# Patient Record
Sex: FEMALE | Race: BLACK OR AFRICAN AMERICAN | NOT HISPANIC OR LATINO | Employment: FULL TIME | URBAN - METROPOLITAN AREA
[De-identification: names, ages, dates, MRNs, and addresses within clinical notes are randomized per-mention and may not be internally consistent; named-entity substitution may affect disease eponyms.]

---

## 2022-02-07 ENCOUNTER — TRANSCRIBE ORDERS (OUTPATIENT)
Dept: ADMINISTRATIVE | Facility: HOSPITAL | Age: 37
End: 2022-02-07

## 2022-02-07 DIAGNOSIS — N64.4 MASTODYNIA: Primary | ICD-10-CM

## 2022-02-07 DIAGNOSIS — N64.4 PAIN OF BOTH BREASTS: Primary | ICD-10-CM

## 2022-02-17 ENCOUNTER — HOSPITAL ENCOUNTER (OUTPATIENT)
Dept: MAMMOGRAPHY | Facility: HOSPITAL | Age: 37
Discharge: HOME | End: 2022-02-17

## 2022-02-17 ENCOUNTER — HOSPITAL ENCOUNTER (OUTPATIENT)
Dept: ULTRASOUND IMAGING | Facility: HOSPITAL | Age: 37
Discharge: HOME | End: 2022-02-17

## 2022-02-17 DIAGNOSIS — N64.4 PAIN OF BOTH BREASTS: ICD-10-CM

## 2022-02-17 DIAGNOSIS — N64.4 MASTODYNIA: ICD-10-CM

## 2022-02-17 PROCEDURE — G0279 TOMOSYNTHESIS, MAMMO: HCPCS

## 2022-02-17 PROCEDURE — 77066 DX MAMMO INCL CAD BI: CPT

## 2022-03-28 ENCOUNTER — HOSPITAL ENCOUNTER (EMERGENCY)
Facility: HOSPITAL | Age: 37
Discharge: HOME | End: 2022-03-28
Attending: EMERGENCY MEDICINE | Admitting: EMERGENCY MEDICINE

## 2022-03-28 VITALS
HEART RATE: 81 BPM | DIASTOLIC BLOOD PRESSURE: 94 MMHG | HEIGHT: 63 IN | BODY MASS INDEX: 39.57 KG/M2 | TEMPERATURE: 98.6 F | OXYGEN SATURATION: 100 % | RESPIRATION RATE: 18 BRPM | SYSTOLIC BLOOD PRESSURE: 154 MMHG | WEIGHT: 223.33 LBS

## 2022-03-28 DIAGNOSIS — J03.90 TONSILLITIS: Primary | ICD-10-CM

## 2022-03-28 LAB
HOLD SPECIMEN: NORMAL
HOLD SPECIMEN: NORMAL
S PYO AG THROAT QL: NEGATIVE
WHOLE BLOOD HOLD SPECIMEN: NORMAL
WHOLE BLOOD HOLD SPECIMEN: NORMAL

## 2022-03-28 PROCEDURE — 87081 CULTURE SCREEN ONLY: CPT | Performed by: EMERGENCY MEDICINE

## 2022-03-28 PROCEDURE — 25010000002 CEFTRIAXONE PER 250 MG: Performed by: NURSE PRACTITIONER

## 2022-03-28 PROCEDURE — 87880 STREP A ASSAY W/OPTIC: CPT

## 2022-03-28 PROCEDURE — 25010000002 DEXAMETHASONE PER 1 MG: Performed by: NURSE PRACTITIONER

## 2022-03-28 PROCEDURE — 99283 EMERGENCY DEPT VISIT LOW MDM: CPT

## 2022-03-28 PROCEDURE — 87147 CULTURE TYPE IMMUNOLOGIC: CPT | Performed by: EMERGENCY MEDICINE

## 2022-03-28 PROCEDURE — 36415 COLL VENOUS BLD VENIPUNCTURE: CPT | Performed by: EMERGENCY MEDICINE

## 2022-03-28 PROCEDURE — 96372 THER/PROPH/DIAG INJ SC/IM: CPT

## 2022-03-28 RX ORDER — HYDROXYZINE HYDROCHLORIDE 10 MG/1
10 TABLET, FILM COATED ORAL 3 TIMES DAILY PRN
COMMUNITY

## 2022-03-28 RX ORDER — DEXAMETHASONE SODIUM PHOSPHATE 10 MG/ML
10 INJECTION INTRAMUSCULAR; INTRAVENOUS ONCE
Status: COMPLETED | OUTPATIENT
Start: 2022-03-28 | End: 2022-03-28

## 2022-03-28 RX ORDER — VENLAFAXINE 37.5 MG/1
37.5 TABLET ORAL 2 TIMES DAILY
COMMUNITY
End: 2022-04-13 | Stop reason: SDUPTHER

## 2022-03-28 RX ORDER — SODIUM CHLORIDE 0.9 % (FLUSH) 0.9 %
10 SYRINGE (ML) INJECTION AS NEEDED
Status: DISCONTINUED | OUTPATIENT
Start: 2022-03-28 | End: 2022-03-28 | Stop reason: HOSPADM

## 2022-03-28 RX ORDER — CEPHALEXIN 500 MG/1
500 CAPSULE ORAL 2 TIMES DAILY
Qty: 20 CAPSULE | Refills: 0 | Status: SHIPPED | OUTPATIENT
Start: 2022-03-28 | End: 2022-05-12

## 2022-03-28 RX ADMIN — DEXAMETHASONE SODIUM PHOSPHATE 10 MG: 10 INJECTION INTRAMUSCULAR; INTRAVENOUS at 18:45

## 2022-03-28 RX ADMIN — LIDOCAINE HYDROCHLORIDE 1 G: 10 INJECTION, SOLUTION EPIDURAL; INFILTRATION; INTRACAUDAL; PERINEURAL at 18:47

## 2022-03-30 LAB — BACTERIA SPEC AEROBE CULT: ABNORMAL

## 2022-04-13 RX ORDER — SERTRALINE HYDROCHLORIDE 25 MG/1
25 TABLET, FILM COATED ORAL DAILY
COMMUNITY
End: 2022-05-12

## 2022-04-13 RX ORDER — IPRATROPIUM BROMIDE AND ALBUTEROL SULFATE 2.5; .5 MG/3ML; MG/3ML
SOLUTION RESPIRATORY (INHALATION)
COMMUNITY
Start: 2022-02-07

## 2022-04-13 RX ORDER — ONDANSETRON 4 MG/1
TABLET, FILM COATED ORAL
COMMUNITY
End: 2022-05-12

## 2022-04-13 RX ORDER — DICYCLOMINE HCL 20 MG
TABLET ORAL
COMMUNITY

## 2022-04-13 RX ORDER — ALBUTEROL SULFATE 90 UG/1
2 AEROSOL, METERED RESPIRATORY (INHALATION) EVERY 4 HOURS PRN
COMMUNITY
Start: 2022-02-07

## 2022-04-13 RX ORDER — IBUPROFEN 800 MG/1
TABLET ORAL
COMMUNITY
Start: 2022-02-07 | End: 2022-05-13 | Stop reason: HOSPADM

## 2022-04-13 RX ORDER — VENLAFAXINE HYDROCHLORIDE 37.5 MG/1
CAPSULE, EXTENDED RELEASE ORAL
COMMUNITY
Start: 2022-02-07

## 2022-04-20 ENCOUNTER — PREP FOR SURGERY (OUTPATIENT)
Dept: OTHER | Facility: HOSPITAL | Age: 37
End: 2022-04-20

## 2022-04-20 ENCOUNTER — OFFICE VISIT (OUTPATIENT)
Dept: OTOLARYNGOLOGY | Facility: CLINIC | Age: 37
End: 2022-04-20

## 2022-04-20 VITALS — BODY MASS INDEX: 40.4 KG/M2 | HEIGHT: 63 IN | WEIGHT: 228 LBS | TEMPERATURE: 97.3 F

## 2022-04-20 DIAGNOSIS — J03.91 RECURRENT TONSILLITIS: Primary | ICD-10-CM

## 2022-04-20 DIAGNOSIS — J35.1 TONSILLAR HYPERTROPHY: ICD-10-CM

## 2022-04-20 PROCEDURE — 99204 OFFICE O/P NEW MOD 45 MIN: CPT | Performed by: OTOLARYNGOLOGY

## 2022-04-20 NOTE — PROGRESS NOTES
Patient Name: Clarisse Tellez   Visit Date: 2022   Patient ID: [id number]  Provider: Laureano Barrera MD    Sex: female  [place] [facility] [place] [facility]   YOB: 1985  Location Address: [address] [place] [address], [address] [place] [address],?[place]?[id number]    Primary Care Provider Monse Sales APRN  Location Phone: [phone]    Referring Provider: KARLA Askew        Chief Complaint  Sore Throat    History of Present Illness  Clarisse Tellez is a 36 y.o. female who presents to [facility] today as a consult from KARLA Askew for evaluation of recurrent tonsillitis.  She tells me she has had issues with recurrent tonsillitis for a number of years.  However, since January she has been treated with 4 rounds of antibiotics for severe sore throat.  Her typical episode involves bilateral sore throat, cervical lymphadenopathy, dysphagia, and occasional trouble breathing.  They are not associated with any rhinorrhea or heartburn.  They tend to vary in duration but often resolve with antibiotics.  She denies any issues with tonsil stones.    Past Medical History:   Diagnosis Date   • Anxiety    • Depression        Past Surgical History:   Procedure Laterality Date   •  SECTION     • CHOLECYSTECTOMY           Current Outpatient Medications:   •  venlafaxine XR (EFFEXOR-XR) 37.5 MG 24 hr capsule, TAKE 1 CAPSULE BY MOUTH EVERY DAY WITH FOOD, Disp: , Rfl:   •  albuterol sulfate  (90 Base) MCG/ACT inhaler, Inhale 2 puffs Every 4 (Four) Hours As Needed., Disp: , Rfl:   •  cephalexin (KEFLEX) 500 MG capsule, Take 1 capsule by mouth 2 (Two) Times a Day., Disp: 20 capsule, Rfl: 0  •  dicyclomine (BENTYL) 20 MG tablet, , Disp: , Rfl:   •  hydrOXYzine (ATARAX) 10 MG tablet, Take 10 mg by mouth 3 (Three) Times a Day As Needed for Itching., Disp: , Rfl:   •  ibuprofen (ADVIL,MOTRIN) 800 MG tablet, TAKE 1 TABLET BY MOUTH THREE TIMES  "DAILY WITH FOOD FOR 14 DAYS AS NEEDED, Disp: , Rfl:   •  ipratropium-albuterol (DUO-NEB) 0.5-2.5 mg/3 ml nebulizer, USE 3 ML VIA NEBULIZER EVERY 6 HOURS AS NEEDED, Disp: , Rfl:   •  ondansetron (ZOFRAN) 4 MG tablet, , Disp: , Rfl:   •  sertraline (ZOLOFT) 25 MG tablet, Take 25 mg by mouth Daily., Disp: , Rfl:      No Known Allergies    Social History     Tobacco Use   • Smoking status: Current Some Day Smoker     Packs/day: 0.25     Years: 1.00     Pack years: 0.25   • Smokeless tobacco: Never Used   Substance Use Topics   • Alcohol use: Not Currently   • Drug use: Never        Objective     Vital Signs:   Temp 97.3 °F (36.3 °C) (Temporal)   Ht 160 cm (63\")   Wt 103 kg (228 lb)   BMI 40.39 kg/m²       Physical Exam    General: Well developed, well nourished patient of stated age in no acute distress. Voice is strong and clear.   Head: Normocephalic and atraumatic.  Face: No lesions.  Bilateral parotid and submandibular glands are unremarkable.  Stensen's and Warthin's ducts are productive of clear saliva bilaterally.  House-Brackmann I/VI     bilaterally.   muscles and temporomandibular joint nontender to palpation.  No TMJ crepitus.  Eyes: PERRLA, sclerae anicteric, no conjunctival injection. Extraocular movements are intact and full. No nystagmus.   Ears: Auricles are normal in appearance. Bilateral external auditory canals are unremarkable. Bilateral tympanic membranes are clear and without effusion. Hearing normal to conversational voice.   Nose: External nose is normal in appearance. Bilateral nares are patent with normal appearing mucosa. Septum midline. Turbinates are unremarkable. No lesions.   Oral Cavity: Lips are normal in appearance. Oral mucosa is unremarkable. Gingiva is unremarkable. Normal dentition for age. Tongue is unremarkable with good movement. Hard palate is unremarkable.   Oropharynx: Soft palate is unremarkable with full movement. Uvula is unremarkable. Bilateral tonsils are 3+ " and cryptic. Posterior oropharynx is unremarkable.    Larynx and hypopharynx: Deferred secondary to gag reflex.  Neck: Supple.  No mass.  Nontender to palpation.  Trachea midline. Thyroid normal size and without nodules to palpation.   Lymphatic: No lymphadenopathy upon palpation.  Respiratory: Clear to auscultation bilaterally, nonlabored respirations    Cardiovascular: RRR, no murmurs, rubs, or gallops,   Psychiatric: Appropriate affect, cooperative   Neurologic: Oriented x 3, strength symmetric in all extremities, Cranial Nerves II-XII are grossly intact to confrontation   Skin: Warm and dry. No rashes.    Procedures           Result Review :               Assessment and Plan    Diagnoses and all orders for this visit:    1. Recurrent tonsillitis (Primary)    2. Tonsillar hypertrophy    Impressions and findings were discussed at great length.  Currently, she is seen for evaluation of recurrent tonsillitis.  Examination today reveals 3+ cryptic tonsils bilaterally.  We discussed the pathophysiology and natural history of both streptococcal and viral tonsillitis.  Options for management including continued observation and medical management versus bilateral tonsillectomy were discussed. The risks, benefits, and alternatives to tonsillectomy were discussed. The risks include dehydration, bleeding, pain, change in voice, continued strep throat, and nasal regurgitation.  After thorough discussion she elected to pursue tonsillectomy.  This will be scheduled at her earliest convenience.        Follow Up   Return if symptoms worsen or fail to improve.  Patient was given instructions and counseling regarding her condition or for health maintenance advice. Please see specific information pulled into the AVS if appropriate.

## 2022-05-12 NOTE — PRE-PROCEDURE INSTRUCTIONS
PATIENT INSTRUCTED TO BE:    - NPO AFTER MIDNIGHT EXCEPT CAN HAVE CLEAR LIQUIDS 2 HOURS PRIOR TO SURGERY ARRIVAL TIME     - TO HOLD ALL VITAMINS, SUPPLEMENTS, NSAIDS FOR ONE WEEK PRIOR TO THEIR SURGICAL PROCEDURE    - INSTRUCTED NO LOTIONS, JEWELRY, PIERCINGS, OR DEODORANT DAY OF SURGERY    - IF DIABETIC, CHECK BLOOD GLUCOSE IF LESS THAN 70 CALL PREOP AREA -AM OF SURGERY     - INSTRUCTED TO TAKE THE FOLLOWING MEDICATIONS THE DAY OF SURGERY:         EFFEXOR, NEBS/INHALERS, BENTYL, HYDROXYZINE PRN    PATIENT VERBALIZED UNDERSTANDING

## 2022-05-13 ENCOUNTER — DOCUMENTATION (OUTPATIENT)
Dept: OTOLARYNGOLOGY | Facility: CLINIC | Age: 37
End: 2022-05-13

## 2022-05-13 ENCOUNTER — ANESTHESIA EVENT (OUTPATIENT)
Dept: PERIOP | Facility: HOSPITAL | Age: 37
End: 2022-05-13

## 2022-05-13 ENCOUNTER — HOSPITAL ENCOUNTER (OUTPATIENT)
Facility: HOSPITAL | Age: 37
Setting detail: HOSPITAL OUTPATIENT SURGERY
Discharge: HOME | End: 2022-05-13
Attending: OTOLARYNGOLOGY | Admitting: OTOLARYNGOLOGY

## 2022-05-13 ENCOUNTER — ANESTHESIA (OUTPATIENT)
Dept: PERIOP | Facility: HOSPITAL | Age: 37
End: 2022-05-13

## 2022-05-13 VITALS
HEIGHT: 63 IN | RESPIRATION RATE: 12 BRPM | HEART RATE: 72 BPM | SYSTOLIC BLOOD PRESSURE: 121 MMHG | WEIGHT: 227.29 LBS | DIASTOLIC BLOOD PRESSURE: 78 MMHG | BODY MASS INDEX: 40.27 KG/M2 | TEMPERATURE: 97.5 F | OXYGEN SATURATION: 91 %

## 2022-05-13 DIAGNOSIS — J03.91 RECURRENT TONSILLITIS: ICD-10-CM

## 2022-05-13 PROCEDURE — 25010000002 PROPOFOL 10 MG/ML EMULSION: Performed by: NURSE ANESTHETIST, CERTIFIED REGISTERED

## 2022-05-13 PROCEDURE — 25010000002 MIDAZOLAM PER 1 MG: Performed by: ANESTHESIOLOGY

## 2022-05-13 PROCEDURE — 0 HYDROMORPHONE 1 MG/ML SOLUTION: Performed by: NURSE ANESTHETIST, CERTIFIED REGISTERED

## 2022-05-13 PROCEDURE — 88304 TISSUE EXAM BY PATHOLOGIST: CPT | Performed by: OTOLARYNGOLOGY

## 2022-05-13 PROCEDURE — 25010000002 FENTANYL CITRATE (PF) 50 MCG/ML SOLUTION: Performed by: NURSE ANESTHETIST, CERTIFIED REGISTERED

## 2022-05-13 PROCEDURE — 25010000002 NEOSTIGMINE 10 MG/10ML SOLUTION: Performed by: NURSE ANESTHETIST, CERTIFIED REGISTERED

## 2022-05-13 PROCEDURE — 25010000002 ONDANSETRON PER 1 MG: Performed by: NURSE ANESTHETIST, CERTIFIED REGISTERED

## 2022-05-13 PROCEDURE — 42826 REMOVAL OF TONSILS: CPT | Performed by: OTOLARYNGOLOGY

## 2022-05-13 PROCEDURE — 25010000002 HYDROMORPHONE 1 MG/ML SOLUTION: Performed by: NURSE ANESTHETIST, CERTIFIED REGISTERED

## 2022-05-13 PROCEDURE — 25010000002 DEXAMETHASONE PER 1 MG: Performed by: NURSE ANESTHETIST, CERTIFIED REGISTERED

## 2022-05-13 RX ORDER — ONDANSETRON 2 MG/ML
4 INJECTION INTRAMUSCULAR; INTRAVENOUS ONCE AS NEEDED
Status: DISCONTINUED | OUTPATIENT
Start: 2022-05-13 | End: 2022-05-13 | Stop reason: HOSPADM

## 2022-05-13 RX ORDER — FENTANYL CITRATE 50 UG/ML
INJECTION, SOLUTION INTRAMUSCULAR; INTRAVENOUS AS NEEDED
Status: DISCONTINUED | OUTPATIENT
Start: 2022-05-13 | End: 2022-05-13 | Stop reason: SURG

## 2022-05-13 RX ORDER — OXYCODONE HYDROCHLORIDE 5 MG/1
5 TABLET ORAL
Status: DISCONTINUED | OUTPATIENT
Start: 2022-05-13 | End: 2022-05-13 | Stop reason: HOSPADM

## 2022-05-13 RX ORDER — LIDOCAINE HYDROCHLORIDE 20 MG/ML
INJECTION, SOLUTION EPIDURAL; INFILTRATION; INTRACAUDAL; PERINEURAL AS NEEDED
Status: DISCONTINUED | OUTPATIENT
Start: 2022-05-13 | End: 2022-05-13 | Stop reason: SURG

## 2022-05-13 RX ORDER — SODIUM CHLORIDE, SODIUM LACTATE, POTASSIUM CHLORIDE, CALCIUM CHLORIDE 600; 310; 30; 20 MG/100ML; MG/100ML; MG/100ML; MG/100ML
9 INJECTION, SOLUTION INTRAVENOUS CONTINUOUS PRN
Status: DISCONTINUED | OUTPATIENT
Start: 2022-05-13 | End: 2022-05-13 | Stop reason: HOSPADM

## 2022-05-13 RX ORDER — PROMETHAZINE HYDROCHLORIDE 12.5 MG/1
25 TABLET ORAL ONCE AS NEEDED
Status: DISCONTINUED | OUTPATIENT
Start: 2022-05-13 | End: 2022-05-13 | Stop reason: HOSPADM

## 2022-05-13 RX ORDER — MAGNESIUM HYDROXIDE 1200 MG/15ML
LIQUID ORAL AS NEEDED
Status: DISCONTINUED | OUTPATIENT
Start: 2022-05-13 | End: 2022-05-13 | Stop reason: HOSPADM

## 2022-05-13 RX ORDER — PREDNISONE 20 MG/1
40 TABLET ORAL DAILY
Qty: 10 TABLET | Refills: 0 | Status: SHIPPED | OUTPATIENT
Start: 2022-05-13 | End: 2022-05-18

## 2022-05-13 RX ORDER — DEXAMETHASONE SODIUM PHOSPHATE 4 MG/ML
INJECTION, SOLUTION INTRA-ARTICULAR; INTRALESIONAL; INTRAMUSCULAR; INTRAVENOUS; SOFT TISSUE AS NEEDED
Status: DISCONTINUED | OUTPATIENT
Start: 2022-05-13 | End: 2022-05-13 | Stop reason: SURG

## 2022-05-13 RX ORDER — GLYCOPYRROLATE 0.2 MG/ML
0.2 INJECTION INTRAMUSCULAR; INTRAVENOUS
Status: COMPLETED | OUTPATIENT
Start: 2022-05-13 | End: 2022-05-13

## 2022-05-13 RX ORDER — MEPERIDINE HYDROCHLORIDE 25 MG/ML
12.5 INJECTION INTRAMUSCULAR; INTRAVENOUS; SUBCUTANEOUS
Status: DISCONTINUED | OUTPATIENT
Start: 2022-05-13 | End: 2022-05-13 | Stop reason: HOSPADM

## 2022-05-13 RX ORDER — NEOSTIGMINE METHYLSULFATE 1 MG/ML
INJECTION, SOLUTION INTRAVENOUS AS NEEDED
Status: DISCONTINUED | OUTPATIENT
Start: 2022-05-13 | End: 2022-05-13 | Stop reason: SURG

## 2022-05-13 RX ORDER — MIDAZOLAM HYDROCHLORIDE 1 MG/ML
2 INJECTION INTRAMUSCULAR; INTRAVENOUS ONCE
Status: COMPLETED | OUTPATIENT
Start: 2022-05-13 | End: 2022-05-13

## 2022-05-13 RX ORDER — PROPOFOL 10 MG/ML
VIAL (ML) INTRAVENOUS AS NEEDED
Status: DISCONTINUED | OUTPATIENT
Start: 2022-05-13 | End: 2022-05-13 | Stop reason: SURG

## 2022-05-13 RX ORDER — ONDANSETRON 2 MG/ML
INJECTION INTRAMUSCULAR; INTRAVENOUS AS NEEDED
Status: DISCONTINUED | OUTPATIENT
Start: 2022-05-13 | End: 2022-05-13 | Stop reason: SURG

## 2022-05-13 RX ORDER — ROCURONIUM BROMIDE 10 MG/ML
INJECTION, SOLUTION INTRAVENOUS AS NEEDED
Status: DISCONTINUED | OUTPATIENT
Start: 2022-05-13 | End: 2022-05-13 | Stop reason: SURG

## 2022-05-13 RX ORDER — GLYCOPYRROLATE 0.2 MG/ML
INJECTION INTRAMUSCULAR; INTRAVENOUS AS NEEDED
Status: DISCONTINUED | OUTPATIENT
Start: 2022-05-13 | End: 2022-05-13 | Stop reason: SURG

## 2022-05-13 RX ORDER — PROMETHAZINE HYDROCHLORIDE 25 MG/1
25 SUPPOSITORY RECTAL ONCE AS NEEDED
Status: DISCONTINUED | OUTPATIENT
Start: 2022-05-13 | End: 2022-05-13 | Stop reason: HOSPADM

## 2022-05-13 RX ORDER — ONDANSETRON HYDROCHLORIDE 8 MG/1
8 TABLET, FILM COATED ORAL EVERY 8 HOURS PRN
Qty: 9 TABLET | Refills: 1 | Status: SHIPPED | OUTPATIENT
Start: 2022-05-13

## 2022-05-13 RX ORDER — ACETAMINOPHEN 500 MG
1000 TABLET ORAL ONCE
Status: COMPLETED | OUTPATIENT
Start: 2022-05-13 | End: 2022-05-13

## 2022-05-13 RX ADMIN — ACETAMINOPHEN 1000 MG: 500 TABLET ORAL at 08:09

## 2022-05-13 RX ADMIN — DEXAMETHASONE SODIUM PHOSPHATE 12 MG: 4 INJECTION INTRA-ARTICULAR; INTRALESIONAL; INTRAMUSCULAR; INTRAVENOUS; SOFT TISSUE at 09:12

## 2022-05-13 RX ADMIN — ONDANSETRON 4 MG: 2 INJECTION INTRAMUSCULAR; INTRAVENOUS at 09:26

## 2022-05-13 RX ADMIN — SODIUM CHLORIDE, POTASSIUM CHLORIDE, SODIUM LACTATE AND CALCIUM CHLORIDE 9 ML/HR: 600; 310; 30; 20 INJECTION, SOLUTION INTRAVENOUS at 08:09

## 2022-05-13 RX ADMIN — GLYCOPYRROLATE 0.2 MG: 0.2 INJECTION INTRAMUSCULAR; INTRAVENOUS at 08:57

## 2022-05-13 RX ADMIN — HYDROMORPHONE HYDROCHLORIDE 1 MG: 1 INJECTION, SOLUTION INTRAMUSCULAR; INTRAVENOUS; SUBCUTANEOUS at 09:19

## 2022-05-13 RX ADMIN — MIDAZOLAM HYDROCHLORIDE 2 MG: 1 INJECTION, SOLUTION INTRAMUSCULAR; INTRAVENOUS at 08:57

## 2022-05-13 RX ADMIN — GLYCOPYRROLATE 0.4 MG: 0.2 INJECTION INTRAMUSCULAR; INTRAVENOUS at 09:34

## 2022-05-13 RX ADMIN — LIDOCAINE HYDROCHLORIDE 100 MG: 20 INJECTION, SOLUTION EPIDURAL; INFILTRATION; INTRACAUDAL; PERINEURAL at 09:02

## 2022-05-13 RX ADMIN — ROCURONIUM BROMIDE 40 MG: 10 INJECTION INTRAVENOUS at 09:02

## 2022-05-13 RX ADMIN — PROPOFOL 200 MG: 10 INJECTION, EMULSION INTRAVENOUS at 09:02

## 2022-05-13 RX ADMIN — HYDROMORPHONE HYDROCHLORIDE 0.5 MG: 1 INJECTION, SOLUTION INTRAMUSCULAR; INTRAVENOUS; SUBCUTANEOUS at 10:38

## 2022-05-13 RX ADMIN — FENTANYL CITRATE 100 MCG: 50 INJECTION, SOLUTION INTRAMUSCULAR; INTRAVENOUS at 09:02

## 2022-05-13 RX ADMIN — NEOSTIGMINE METHYLSULFATE 3 MG: 0.5 INJECTION INTRAVENOUS at 09:34

## 2022-05-13 NOTE — ANESTHESIA POSTPROCEDURE EVALUATION
Patient: Clarisse Tellez    Procedure Summary     Date: 05/13/22 Room / Location: [facility] OR  / [facility]    Anesthesia Start: 0859 Anesthesia Stop: 0948    Procedure: TONSILLECTOMY (N/A Throat) Diagnosis:       Recurrent tonsillitis      (Recurrent tonsillitis [J03.91])    Surgeons: Laureano Barrera MD Provider: Eduardo Paulino MD    Anesthesia Type: general ASA Status: 2          Anesthesia Type: general    Vitals  Vitals Value Taken Time   /74 05/13/22 0947   Temp     Pulse 79 05/13/22 0950   Resp     SpO2 93 % 05/13/22 0950   Vitals shown include unvalidated device data.        Post Anesthesia Care and Evaluation    Patient location during evaluation: bedside  Patient participation: complete - patient participated  Level of consciousness: responsive to verbal stimuli, responsive to light touch, responsive to noxious stimuli, responsive to painful stimuli, responsive to physical stimuli and sleepy but conscious  Pain score: 3  Pain management: adequate  Airway patency: patent  Anesthetic complications: No anesthetic complications  PONV Status: none  Cardiovascular status: acceptable and stable  Respiratory status: acceptable and nasal cannula  Hydration status: acceptable    Comments: An Anesthesiologist personally participated in the most demanding procedures (including induction and emergence if applicable) in the anesthesia plan, monitored the course of anesthesia administration at frequent intervals and remained physically present and available for immediate diagnosis and treatment of emergencies.

## 2022-05-13 NOTE — DISCHARGE INSTRUCTIONS
DISCHARGE INSTRUCTIONS  TONSILLECTOMY/ADENOIDECTOMY  For your surgery you had:  General anesthesia (you may have a sore throat for the first 24 hours)  You received an anesthesia medication today that can cause hormonal forms of birth control to be ineffective. You should use a different form of birth control (to prevent pregnancy) for 7 days.  IV sedation  Local anesthesia  Monitored anesthesia care  You received a medicated patch for nausea prevention today (behind your ear). It is recommended that you remove it 24-48 hours post-operatively. It must be removed within 72 hours.   You may experience dizziness, drowsiness, or lightheadedness for several hours following surgery.  Do not stay alone today or tonight.  Limit your activity for 24 hours.  You should not drive or operate machinery, drink alcohol, or sign legally binding documents for 24 hours or while you are taking pain medication.  Resume your diet slowly.  Follow any special dietary instructions you may have been given by your doctor.  Last dose of pain medication was given at:    NOTIFY YOUR DOCTOR IF YOU EXPERIENCE ANY OF THE FOLLOWING:  Temperature greater than 102° Fahrenheit  Shaking chills  Redness or excessive drainage from incision  Nausea, vomiting and/or pain that is not controlled by prescribed medications  Increase in bleeding or bleeding that is excessive  Unable to urinate in 6 hours after surgery  If unable to reach your doctor, please go to the closest Emergency room Encourage the patient to drink liquids every hour the day of surgery and every two hours during the night.  We would like for the patient to drink at least 2-3 quarts of liquid within a 24-hour period.  Avoid red liquids.  Keep cool mist humidifier in the room with the patient.  If excessive bleeding should occur, bring the patient to the Emergency Room.  The ER doctor will notify the doctor.  If low grade fever develops, encourage the patient to drink more.  If temperature  is over 102°, notify your doctor.  Rest is encouraged for several days following surgery.  Keep head elevated on at least one pillow.  Medications per physician instructions as indicated on Discharge Medication Information Sheet.  You should see   for follow-up care  on   .  Phone number:      SPECIAL INSTRUCTIONS:

## 2022-05-13 NOTE — ANESTHESIA PREPROCEDURE EVALUATION
Anesthesia Evaluation     Patient summary reviewed and Nursing notes reviewed   no history of anesthetic complications:  NPO Solid Status: > 8 hours  NPO Liquid Status: > 2 hours           Airway   Mallampati: II  TM distance: >3 FB  Neck ROM: full  No difficulty expected  Dental      Pulmonary - normal exam    breath sounds clear to auscultation  (+) asthma,  Cardiovascular - negative cardio ROS and normal exam  Exercise tolerance: good (4-7 METS)    Rhythm: regular  Rate: normal        Neuro/Psych- negative ROS  GI/Hepatic/Renal/Endo - negative ROS     Musculoskeletal (-) negative ROS    Abdominal    Substance History - negative use     OB/GYN negative ob/gyn ROS         Other - negative ROS                       Anesthesia Plan    ASA 2     general   (Patient understands anesthesia not responsible for dental damage.    Pt does not have inhaler with her this am, states breathing is as good as it gets)  intravenous induction     Anesthetic plan, all risks, benefits, and alternatives have been provided, discussed and informed consent has been obtained with: patient.  Use of blood products discussed with patient .   Plan discussed with CRNA.        CODE STATUS:

## 2022-05-13 NOTE — INTERVAL H&P NOTE
H&P reviewed.  The patient was examined and there are no changes to the H&P. Allergies were reviewed.

## 2022-05-13 NOTE — OP NOTE
TONSILLECTOMY AND ADENOIDECTOMY  Procedure Report    Patient Name:  Clarisse Tellez  YOB: 1985    Date of Surgery:  5/13/2022     Indications:      Pre-op Diagnosis:   Recurrent tonsillitis [J03.91]       Post-Op Diagnosis Codes:     * Recurrent tonsillitis [J03.91]    Procedure/CPT® Codes:      Procedure(s):  TONSILLECTOMY    Staff:  Surgeon(s):  Laureano Barrera MD         Anesthesia: Choice    Estimated Blood Loss: 2 mL    Implants:    Nothing was implanted during the procedure    Specimen:          Specimens     ID Source Type Tests Collected By Collected At Frozen?    A Tonsils Tissue · TISSUE PATHOLOGY EXAM   Laureano Barrera MD 5/13/22 0915 No    Description: RIGHT TONSIL    B Tonsils Tissue · TISSUE PATHOLOGY EXAM   Laureano Barrera MD 5/13/22 0919 No    Description: LEFT TONSIL              Findings: 3+ cryptic tonsils bilaterally    Complications: None    Description of Procedure:   The patient was brought back to the operating room and placed supine upon the table. General endotracheal anesthesia was successfully established and the patient was prepped and draped in the usual manner for adenotonsillectomy. The McIvor mouth gag was inserted and used to expose the bilateral tonsils which were noted to be 3+. The right tonsil was grasped with the Allis forceps and retracted medially to aid in exposure. It was dissected from its fossa in the usual manner using Bovie cautery. Hemostasis was obtained using suction cautery. A similar procedure was then repeated on the patient's left palatine tonsil. The oropharynx was then irrigated profusely with sterile saline and again hemostasis was checked and confirmed. An orogastric tube placed and used to evacuate the patient's stomach contents. The McIvor mouth gag was then removed and the patient was returned to the care of anesthesia. The patient tolerated the procedure well and was transferred to the recovery room  in satisfactory condition without apparent complication.          Laureano Barrera MD     Date: 5/13/2022  Time: 09:50 EDT

## 2022-05-16 ENCOUNTER — OFFICE VISIT (OUTPATIENT)
Dept: OTOLARYNGOLOGY | Facility: CLINIC | Age: 37
End: 2022-05-16

## 2022-05-16 VITALS — HEIGHT: 63 IN | WEIGHT: 226.2 LBS | BODY MASS INDEX: 40.08 KG/M2 | TEMPERATURE: 97.7 F

## 2022-05-16 DIAGNOSIS — J35.1 TONSILLAR HYPERTROPHY: ICD-10-CM

## 2022-05-16 DIAGNOSIS — J03.91 RECURRENT TONSILLITIS: Primary | ICD-10-CM

## 2022-05-16 DIAGNOSIS — R05.9 COUGH: ICD-10-CM

## 2022-05-16 LAB
CYTO UR: NORMAL
LAB AP CASE REPORT: NORMAL
LAB AP CLINICAL INFORMATION: NORMAL
PATH REPORT.FINAL DX SPEC: NORMAL
PATH REPORT.GROSS SPEC: NORMAL

## 2022-05-16 PROCEDURE — 99024 POSTOP FOLLOW-UP VISIT: CPT | Performed by: OTOLARYNGOLOGY

## 2022-05-16 RX ORDER — GUAIFENESIN/DEXTROMETHORPHAN 100-10MG/5
5 SYRUP ORAL 3 TIMES DAILY PRN
Qty: 150 ML | Refills: 1 | Status: SHIPPED | OUTPATIENT
Start: 2022-05-16

## 2022-05-16 NOTE — PROGRESS NOTES
"Patient Name: Clarisse Tellez   Visit Date: 2022   Patient ID: [id number]  Provider: Laureano Barrera MD    Sex: female  [place] [facility] [place] [facility]   YOB: 1985  Location Address: [address] [place] [address], [address],?[place]?[address]    Primary Care Provider Monse Sales APRN  Location Phone: [phone]    Referring Provider: No ref. provider found        Chief Complaint  Post op bleeding    History of Present Illness  Clarisse Tellez is a 36 y.o. female who returns today for follow-up status post tonsillectomy on 2022 secondary to recurrent tonsillitis.  Final pathology was found to be consistent with reactive lymphoid hyperplasia and acute inflammation.  She tells me that she has had issues with cough since last night and has been coughing up some bright red blood intermixed with mucus and pieces of scab.  She has not had any issues with vomiting and the coughing is very intermittent.  She is drinking well.  Her pain is well controlledwith her Hycet and ibuprofen.  She was prescribed a course of prednisone and Zofran on Friday secondary to uvular edema and nausea.  She has not picked up these medications and is doing better from that standpoint.    Past Medical History:   Diagnosis Date   • Anxiety    • Asthma     USES INHALERS \"SEASONAL\"   • Depression    • Recurrent tonsillitis     CURRENTLY       Past Surgical History:   Procedure Laterality Date   •  SECTION     • CHOLECYSTECTOMY     • TONSILLECTOMY AND ADENOIDECTOMY N/A 2022    Procedure: TONSILLECTOMY;  Surgeon: Laureano Barrera MD;  [place] [facility] [place]  Service: ENT;  Laterality: N/A;         Current Outpatient Medications:   •  albuterol sulfate  (90 Base) MCG/ACT inhaler, Inhale 2 puffs Every 4 (Four) Hours As Needed., Disp: , Rfl:   •  dicyclomine (BENTYL) 20 MG tablet, , Disp: , Rfl:   •  HYDROcodone-acetaminophen (HYCET) 7.5-325 MG/15ML solution, Take 15 mL by " "mouth Every 6 (Six) Hours As Needed for Moderate Pain ., Disp: 420 mL, Rfl: 0  •  hydrOXYzine (ATARAX) 10 MG tablet, Take 10 mg by mouth 3 (Three) Times a Day As Needed for Itching., Disp: , Rfl:   •  ibuprofen (ADVIL,MOTRIN) 100 MG/5ML suspension, Take 40 mL by mouth Every 6 (Six) Hours As Needed (pain)., Disp: 473 mL, Rfl: 1  •  ipratropium-albuterol (DUO-NEB) 0.5-2.5 mg/3 ml nebulizer, USE 3 ML VIA NEBULIZER EVERY 6 HOURS AS NEEDED, Disp: , Rfl:   •  ondansetron (Zofran) 8 MG tablet, Take 1 tablet by mouth Every 8 (Eight) Hours As Needed for Nausea or Vomiting., Disp: 9 tablet, Rfl: 1  •  predniSONE (DELTASONE) 20 MG tablet, Take 2 tablets by mouth Daily for 5 days., Disp: 10 tablet, Rfl: 0  •  venlafaxine XR (EFFEXOR-XR) 37.5 MG 24 hr capsule, TAKE 1 CAPSULE BY MOUTH EVERY DAY WITH FOOD, Disp: , Rfl:   •  guaiFENesin-dextromethorphan (ROBITUSSIN DM) 100-10 MG/5ML syrup, Take 5 mL by mouth 3 (Three) Times a Day As Needed for Cough., Disp: 150 mL, Rfl: 1     No Known Allergies    Social History     Tobacco Use   • Smoking status: Current Some Day Smoker     Packs/day: 0.25     Years: 1.00     Pack years: 0.25   • Smokeless tobacco: Never Used   • Tobacco comment: INSTRUCTED NO SMOKING 24 HR PRIOR TO SURGERY -- last smoke \"day ago\"   Vaping Use   • Vaping Use: Never used   Substance Use Topics   • Alcohol use: Not Currently   • Drug use: Never        Objective     Vital Signs:   Temp 97.7 °F (36.5 °C) (Temporal)   Ht 160 cm (63\")   Wt 103 kg (226 lb 3.2 oz)   BMI 40.07 kg/m²       Physical Exam    General: Well developed, well nourished patient of stated age in no acute distress. Voice is strong and clear.   Head: Normocephalic and atraumatic.  Face: No lesions.  Bilateral parotid and submandibular glands are unremarkable.  Stensen's and Warthin's ducts are productive of clear saliva bilaterally.  House-Brackmann I/VI     bilaterally.   muscles and temporomandibular joint nontender to palpation.  No " TMJ crepitus.  Eyes: PERRLA, sclerae anicteric, no conjunctival injection. Extraocular movements are intact and full. No nystagmus.   Ears: Auricles are normal in appearance. Bilateral external auditory canals are unremarkable. Bilateral tympanic membranes are clear and without effusion. Hearing normal to conversational voice.   Nose: External nose is normal in appearance. Bilateral nares are patent with normal appearing mucosa. Septum midline. Turbinates are unremarkable. No lesions.   Oral Cavity: Lips are normal in appearance. Oral mucosa is unremarkable. Gingiva is unremarkable. Normal dentition for age. Tongue is unremarkable with good movement. Hard palate is unremarkable.   Oropharynx: Soft palate is unremarkable with full movement. Uvula is unremarkable. Bilateral tonsillar fossa without clot but obviously is sloughing crust/scab.  She was able to gargle with cold water repeatedly bringing up pieces of the crusting but no active bleeding. Posterior oropharynx is unremarkable.    Larynx and hypopharynx: Deferred secondary to gag reflex.  Neck: Supple.  No mass.  Nontender to palpation.  Trachea midline. Thyroid normal size and without nodules to palpation.   Lymphatic: No lymphadenopathy upon palpation.   Psychiatric: Appropriate affect, cooperative   Neurologic: Oriented x 3, strength symmetric in all extremities, Cranial Nerves II-XII are grossly intact to confrontation   Skin: Warm and dry. No rashes.    Procedures           Result Review :               Assessment and Plan    Diagnoses and all orders for this visit:    1. Recurrent tonsillitis (Primary)    2. Tonsillar hypertrophy    3. Cough  -     guaiFENesin-dextromethorphan (ROBITUSSIN DM) 100-10 MG/5ML syrup; Take 5 mL by mouth 3 (Three) Times a Day As Needed for Cough.  Dispense: 150 mL; Refill: 1    Impressions and findings were discussed at great length.  Currently, she appears to be experiencing intermittent oozing as part of her scabs separate  bilaterally secondary to coughing.  There is no evidence of active bleeding at this time.  We discussed worrisome signs or symptoms for which she will call me immediately.  She will also be tried on Robitussin-DM for the cough but she understands that oftentimes this is not very helpful as cough is often secondary to increased mucus production as part of the typical recovery process.    Follow Up   No follow-ups on file.  Patient was given instructions and counseling regarding her condition or for health maintenance advice. Please see specific information pulled into the AVS if appropriate.

## 2022-05-19 DIAGNOSIS — J03.91 RECURRENT TONSILLITIS: Primary | ICD-10-CM

## 2022-06-08 ENCOUNTER — OFFICE VISIT (OUTPATIENT)
Dept: OTOLARYNGOLOGY | Facility: CLINIC | Age: 37
End: 2022-06-08

## 2022-06-08 VITALS — TEMPERATURE: 97.7 F | HEIGHT: 63 IN | BODY MASS INDEX: 40.89 KG/M2 | WEIGHT: 230.8 LBS

## 2022-06-08 DIAGNOSIS — J35.1 TONSILLAR HYPERTROPHY: ICD-10-CM

## 2022-06-08 DIAGNOSIS — J03.91 RECURRENT TONSILLITIS: Primary | ICD-10-CM

## 2022-06-08 PROCEDURE — 99024 POSTOP FOLLOW-UP VISIT: CPT | Performed by: OTOLARYNGOLOGY

## 2022-06-08 RX ORDER — CYCLOBENZAPRINE HCL 10 MG
5 TABLET ORAL 2 TIMES DAILY PRN
Qty: 30 TABLET | Refills: 0 | Status: SHIPPED | OUTPATIENT
Start: 2022-06-08 | End: 2022-07-06

## 2022-06-08 NOTE — PROGRESS NOTES
"Patient Name: Clarisse Tellez   Visit Date: 2022   Patient ID: [id number]  Provider: Laureano Barrera MD    Sex: female  Location: [facility]   YOB: 1985  Location Address: [address] [place] [address], [address],?[place]?[address]    Primary Care Provider Monse Sales APRN  Location Phone: [phone]    Referring Provider: No ref. provider found        Chief Complaint  Post-op    History of Present Illness  Clarisse Tellez is a 36 y.o. female who returns today for follow-up status post tonsillectomy on 2022 secondary to recurrent tonsillitis.  Final pathology was found to be consistent with reactive lymphoid hyperplasia and acute inflammation.  She was then seen on 2022 after experiencing some bleeding with coughing.  She tells me that she is still experiencing some mild pain swallowing certain foods. She feels as though she is having difficulty talking for long periods of time.     Past Medical History:   Diagnosis Date   • Anxiety    • Asthma     USES INHALERS \"SEASONAL\"   • Depression    • Recurrent tonsillitis     CURRENTLY       Past Surgical History:   Procedure Laterality Date   •  SECTION     • CHOLECYSTECTOMY     • TONSILLECTOMY AND ADENOIDECTOMY N/A 2022    Procedure: TONSILLECTOMY;  Surgeon: Laureano Barrera MD;  Location: [facility] [place];  Service: ENT;  Laterality: N/A;         Current Outpatient Medications:   •  albuterol sulfate  (90 Base) MCG/ACT inhaler, Inhale 2 puffs Every 4 (Four) Hours As Needed., Disp: , Rfl:   •  dicyclomine (BENTYL) 20 MG tablet, , Disp: , Rfl:   •  guaiFENesin-dextromethorphan (ROBITUSSIN DM) 100-10 MG/5ML syrup, Take 5 mL by mouth 3 (Three) Times a Day As Needed for Cough., Disp: 150 mL, Rfl: 1  •  HYDROcodone-acetaminophen (HYCET) 7.5-325 MG/15ML solution, Take 15 mL by mouth Every 6 (Six) Hours As Needed for Moderate Pain ., Disp: 420 mL, Rfl: 0  •  hydrOXYzine (ATARAX) 10 MG " "tablet, Take 10 mg by mouth 3 (Three) Times a Day As Needed for Itching., Disp: , Rfl:   •  ibuprofen (ADVIL,MOTRIN) 100 MG/5ML suspension, Take 40 mL by mouth Every 6 (Six) Hours As Needed (pain)., Disp: 473 mL, Rfl: 1  •  ipratropium-albuterol (DUO-NEB) 0.5-2.5 mg/3 ml nebulizer, USE 3 ML VIA NEBULIZER EVERY 6 HOURS AS NEEDED, Disp: , Rfl:   •  ondansetron (Zofran) 8 MG tablet, Take 1 tablet by mouth Every 8 (Eight) Hours As Needed for Nausea or Vomiting., Disp: 9 tablet, Rfl: 1  •  venlafaxine XR (EFFEXOR-XR) 37.5 MG 24 hr capsule, TAKE 1 CAPSULE BY MOUTH EVERY DAY WITH FOOD, Disp: , Rfl:   •  cyclobenzaprine (FLEXERIL) 10 MG tablet, Take 0.5 tablets by mouth 2 (Two) Times a Day As Needed for Muscle Spasms for up to 28 days., Disp: 30 tablet, Rfl: 0     No Known Allergies    Social History     Tobacco Use   • Smoking status: Former Smoker     Packs/day: 0.25     Years: 1.00     Pack years: 0.25     Quit date:      Years since quittin.4   • Smokeless tobacco: Never Used   Vaping Use   • Vaping Use: Never used   Substance Use Topics   • Alcohol use: Not Currently   • Drug use: Never        Objective     Vital Signs:   Temp 97.7 °F (36.5 °C) (Temporal)   Ht 160 cm (63\")   Wt 105 kg (230 lb 12.8 oz)   BMI 40.88 kg/m²       Physical Exam    General: Well developed, well nourished patient of stated age in no acute distress. Voice is strong and clear.   Head: Normocephalic and atraumatic.  Face: No lesions.  Bilateral parotid and submandibular glands are unremarkable.  Stensen's and Warthin's ducts are productive of clear saliva bilaterally.  House-Brackmann I/VI     bilaterally.   muscles and temporomandibular joint nontender to palpation.  No TMJ crepitus.  Eyes: PERRLA, sclerae anicteric, no conjunctival injection. Extraocular movements are intact and full. No nystagmus.   Ears: Auricles are normal in appearance. Bilateral external auditory canals are unremarkable. Bilateral tympanic membranes " are clear and without effusion. Hearing normal to conversational voice.   Nose: External nose is normal in appearance. Bilateral nares are patent with normal appearing mucosa. Septum midline. Turbinates are unremarkable. No lesions.   Oral Cavity: Lips are normal in appearance. Oral mucosa is unremarkable. Gingiva is unremarkable. Normal dentition for age. Tongue is unremarkable with good movement. Hard palate is unremarkable.   Oropharynx: Soft palate is unremarkable with full movement. Uvula is unremarkable. Bilateral tonsillar fossa are healing well.  Posterior oropharynx is unremarkable.    Larynx and hypopharynx: Deferred secondary to gag reflex.  Neck: Supple.  No mass.  Nontender to palpation.  Trachea midline. Thyroid normal size and without nodules to palpation.   Lymphatic: No lymphadenopathy upon palpation.   Psychiatric: Appropriate affect, cooperative   Neurologic: Oriented x 3, strength symmetric in all extremities, Cranial Nerves II-XII are grossly intact to confrontation   Skin: Warm and dry. No rashes.    Procedures           Result Review :               Assessment and Plan    Diagnoses and all orders for this visit:    1. Recurrent tonsillitis (Primary)  -     cyclobenzaprine (FLEXERIL) 10 MG tablet; Take 0.5 tablets by mouth 2 (Two) Times a Day As Needed for Muscle Spasms for up to 28 days.  Dispense: 30 tablet; Refill: 0    2. Tonsillar hypertrophy  -     cyclobenzaprine (FLEXERIL) 10 MG tablet; Take 0.5 tablets by mouth 2 (Two) Times a Day As Needed for Muscle Spasms for up to 28 days.  Dispense: 30 tablet; Refill: 0    Impressions and findings were discussed at great length.  Currently, she is experiencing some difficulty when speaking for long periods of time and feels like certain foods still are difficult to swallow.  Examination today reveals her tonsillar fossa to be healing well.  We discussed that often times is muscles in that area 10 to tighten up after surgery and that ultimately  time will make that better but in the meantime we may try her on a course of Flexeril.  She was given ample time to ask questions, all of which were answered to her satisfaction.  She may follow-up at her previously scheduled appointment.      Follow Up   No follow-ups on file.  Patient was given instructions and counseling regarding her condition or for health maintenance advice. Please see specific information pulled into the AVS if appropriate.

## 2022-07-09 ENCOUNTER — HOSPITAL ENCOUNTER (EMERGENCY)
Facility: HOSPITAL | Age: 37
Discharge: HOME | End: 2022-07-09
Attending: EMERGENCY MEDICINE | Admitting: EMERGENCY MEDICINE

## 2022-07-09 ENCOUNTER — APPOINTMENT (OUTPATIENT)
Dept: CT IMAGING | Facility: HOSPITAL | Age: 37
End: 2022-07-09

## 2022-07-09 VITALS
SYSTOLIC BLOOD PRESSURE: 132 MMHG | WEIGHT: 231.92 LBS | DIASTOLIC BLOOD PRESSURE: 81 MMHG | TEMPERATURE: 98.2 F | OXYGEN SATURATION: 98 % | HEIGHT: 63 IN | RESPIRATION RATE: 18 BRPM | BODY MASS INDEX: 41.09 KG/M2 | HEART RATE: 76 BPM

## 2022-07-09 DIAGNOSIS — S16.1XXA STRAIN OF NECK MUSCLE, INITIAL ENCOUNTER: Primary | ICD-10-CM

## 2022-07-09 PROCEDURE — 99283 EMERGENCY DEPT VISIT LOW MDM: CPT

## 2022-07-09 PROCEDURE — 70450 CT HEAD/BRAIN W/O DYE: CPT

## 2022-07-09 PROCEDURE — 72125 CT NECK SPINE W/O DYE: CPT

## 2022-07-09 RX ORDER — ACETAMINOPHEN 500 MG
1000 TABLET ORAL ONCE
Status: COMPLETED | OUTPATIENT
Start: 2022-07-09 | End: 2022-07-09

## 2022-07-09 RX ORDER — CYCLOBENZAPRINE HCL 10 MG
10 TABLET ORAL 3 TIMES DAILY
Qty: 20 TABLET | Refills: 0 | Status: SHIPPED | OUTPATIENT
Start: 2022-07-09

## 2022-07-09 RX ADMIN — ACETAMINOPHEN 1000 MG: 500 TABLET ORAL at 09:51
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
## 2024-09-04 ENCOUNTER — HOSPITAL ENCOUNTER (INPATIENT)
Facility: HOSPITAL | Age: 39
LOS: 1 days | Discharge: HOME | DRG: 392 | End: 2024-09-07
Attending: EMERGENCY MEDICINE | Admitting: INTERNAL MEDICINE

## 2024-09-04 DIAGNOSIS — K57.32 SIGMOID DIVERTICULITIS: Primary | ICD-10-CM

## 2024-09-04 LAB
ALBUMIN SERPL-MCNC: 4.2 G/DL (ref 3.5–5.2)
ALBUMIN/GLOB SERPL: 1.6 G/DL
ALP SERPL-CCNC: 54 U/L (ref 39–117)
ALT SERPL W P-5'-P-CCNC: 13 U/L (ref 1–33)
ANION GAP SERPL CALCULATED.3IONS-SCNC: 11.5 MMOL/L (ref 5–15)
AST SERPL-CCNC: 18 U/L (ref 1–32)
BASOPHILS # BLD AUTO: 0.04 10*3/MM3 (ref 0–0.2)
BASOPHILS NFR BLD AUTO: 0.4 % (ref 0–1.5)
BILIRUB SERPL-MCNC: 0.3 MG/DL (ref 0–1.2)
BILIRUB UR QL STRIP: NEGATIVE
BUN SERPL-MCNC: 9 MG/DL (ref 6–20)
BUN/CREAT SERPL: 10 (ref 7–25)
CALCIUM SPEC-SCNC: 9 MG/DL (ref 8.6–10.5)
CHLORIDE SERPL-SCNC: 102 MMOL/L (ref 98–107)
CLARITY UR: CLEAR
CO2 SERPL-SCNC: 25.5 MMOL/L (ref 22–29)
COLOR UR: YELLOW
CREAT SERPL-MCNC: 0.9 MG/DL (ref 0.57–1)
DEPRECATED RDW RBC AUTO: 40.5 FL (ref 37–54)
EGFRCR SERPLBLD CKD-EPI 2021: 83.6 ML/MIN/1.73
EOSINOPHIL # BLD AUTO: 0.43 10*3/MM3 (ref 0–0.4)
EOSINOPHIL NFR BLD AUTO: 3.9 % (ref 0.3–6.2)
ERYTHROCYTE [DISTWIDTH] IN BLOOD BY AUTOMATED COUNT: 13.2 % (ref 12.3–15.4)
GLOBULIN UR ELPH-MCNC: 2.6 GM/DL
GLUCOSE SERPL-MCNC: 95 MG/DL (ref 65–99)
GLUCOSE UR STRIP-MCNC: NEGATIVE MG/DL
HCG INTACT+B SERPL-ACNC: <0.5 MIU/ML
HCT VFR BLD AUTO: 35 % (ref 34–46.6)
HGB BLD-MCNC: 12.1 G/DL (ref 12–15.9)
HGB UR QL STRIP.AUTO: NEGATIVE
HOLD SPECIMEN: NORMAL
HOLD SPECIMEN: NORMAL
IMM GRANULOCYTES # BLD AUTO: 0.04 10*3/MM3 (ref 0–0.05)
IMM GRANULOCYTES NFR BLD AUTO: 0.4 % (ref 0–0.5)
KETONES UR QL STRIP: NEGATIVE
LEUKOCYTE ESTERASE UR QL STRIP.AUTO: NEGATIVE
LIPASE SERPL-CCNC: 20 U/L (ref 13–60)
LYMPHOCYTES # BLD AUTO: 1.5 10*3/MM3 (ref 0.7–3.1)
LYMPHOCYTES NFR BLD AUTO: 13.7 % (ref 19.6–45.3)
MCH RBC QN AUTO: 29.2 PG (ref 26.6–33)
MCHC RBC AUTO-ENTMCNC: 34.6 G/DL (ref 31.5–35.7)
MCV RBC AUTO: 84.5 FL (ref 79–97)
MONOCYTES # BLD AUTO: 0.94 10*3/MM3 (ref 0.1–0.9)
MONOCYTES NFR BLD AUTO: 8.6 % (ref 5–12)
NEUTROPHILS NFR BLD AUTO: 73 % (ref 42.7–76)
NEUTROPHILS NFR BLD AUTO: 8.03 10*3/MM3 (ref 1.7–7)
NITRITE UR QL STRIP: NEGATIVE
NRBC BLD AUTO-RTO: 0 /100 WBC (ref 0–0.2)
PH UR STRIP.AUTO: 6 [PH] (ref 5–8)
PLATELET # BLD AUTO: 282 10*3/MM3 (ref 140–450)
PMV BLD AUTO: 8.8 FL (ref 6–12)
POTASSIUM SERPL-SCNC: 3.9 MMOL/L (ref 3.5–5.2)
PROT SERPL-MCNC: 6.8 G/DL (ref 6–8.5)
PROT UR QL STRIP: NEGATIVE
RBC # BLD AUTO: 4.14 10*6/MM3 (ref 3.77–5.28)
SODIUM SERPL-SCNC: 139 MMOL/L (ref 136–145)
SP GR UR STRIP: 1.02 (ref 1–1.03)
UROBILINOGEN UR QL STRIP: NORMAL
WBC NRBC COR # BLD AUTO: 10.98 10*3/MM3 (ref 3.4–10.8)
WHOLE BLOOD HOLD COAG: NORMAL
WHOLE BLOOD HOLD SPECIMEN: NORMAL

## 2024-09-04 PROCEDURE — 36415 COLL VENOUS BLD VENIPUNCTURE: CPT | Performed by: EMERGENCY MEDICINE

## 2024-09-04 PROCEDURE — 80053 COMPREHEN METABOLIC PANEL: CPT

## 2024-09-04 PROCEDURE — 81003 URINALYSIS AUTO W/O SCOPE: CPT

## 2024-09-04 PROCEDURE — 83690 ASSAY OF LIPASE: CPT

## 2024-09-04 PROCEDURE — 99285 EMERGENCY DEPT VISIT HI MDM: CPT

## 2024-09-04 PROCEDURE — 84702 CHORIONIC GONADOTROPIN TEST: CPT

## 2024-09-04 PROCEDURE — 85025 COMPLETE CBC W/AUTO DIFF WBC: CPT | Performed by: EMERGENCY MEDICINE

## 2024-09-04 RX ORDER — SODIUM CHLORIDE 0.9 % (FLUSH) 0.9 %
10 SYRINGE (ML) INJECTION AS NEEDED
Status: DISCONTINUED | OUTPATIENT
Start: 2024-09-04 | End: 2024-09-07 | Stop reason: HOSPADM

## 2024-09-05 ENCOUNTER — APPOINTMENT (OUTPATIENT)
Dept: CT IMAGING | Facility: HOSPITAL | Age: 39
DRG: 392 | End: 2024-09-05

## 2024-09-05 PROBLEM — K57.92 DIVERTICULITIS: Status: ACTIVE | Noted: 2024-09-05

## 2024-09-05 PROCEDURE — 74177 CT ABD & PELVIS W/CONTRAST: CPT

## 2024-09-05 PROCEDURE — 25010000002 METRONIDAZOLE 500 MG/100ML SOLUTION: Performed by: EMERGENCY MEDICINE

## 2024-09-05 PROCEDURE — 25010000002 HYDROMORPHONE 1 MG/ML SOLUTION: Performed by: INTERNAL MEDICINE

## 2024-09-05 PROCEDURE — 25510000001 IOPAMIDOL PER 1 ML: Performed by: EMERGENCY MEDICINE

## 2024-09-05 PROCEDURE — 25010000002 ONDANSETRON PER 1 MG: Performed by: EMERGENCY MEDICINE

## 2024-09-05 PROCEDURE — G0378 HOSPITAL OBSERVATION PER HR: HCPCS

## 2024-09-05 PROCEDURE — 25010000002 CEFTRIAXONE PER 250 MG: Performed by: NURSE PRACTITIONER

## 2024-09-05 PROCEDURE — 25010000002 CEFTRIAXONE PER 250 MG: Performed by: EMERGENCY MEDICINE

## 2024-09-05 PROCEDURE — 25010000002 HYDROMORPHONE 1 MG/ML SOLUTION: Performed by: EMERGENCY MEDICINE

## 2024-09-05 RX ORDER — POLYETHYLENE GLYCOL 3350 17 G/17G
17 POWDER, FOR SOLUTION ORAL DAILY PRN
Status: DISCONTINUED | OUTPATIENT
Start: 2024-09-05 | End: 2024-09-07 | Stop reason: HOSPADM

## 2024-09-05 RX ORDER — ONDANSETRON 2 MG/ML
4 INJECTION INTRAMUSCULAR; INTRAVENOUS EVERY 4 HOURS PRN
Status: DISCONTINUED | OUTPATIENT
Start: 2024-09-05 | End: 2024-09-05

## 2024-09-05 RX ORDER — ACETAMINOPHEN 325 MG/1
650 TABLET ORAL EVERY 4 HOURS PRN
Status: DISCONTINUED | OUTPATIENT
Start: 2024-09-05 | End: 2024-09-07 | Stop reason: HOSPADM

## 2024-09-05 RX ORDER — METRONIDAZOLE 500 MG/1
500 TABLET ORAL EVERY 6 HOURS SCHEDULED
Status: DISCONTINUED | OUTPATIENT
Start: 2024-09-05 | End: 2024-09-07 | Stop reason: HOSPADM

## 2024-09-05 RX ORDER — METRONIDAZOLE 500 MG/100ML
500 INJECTION, SOLUTION INTRAVENOUS EVERY 8 HOURS
Status: DISCONTINUED | OUTPATIENT
Start: 2024-09-05 | End: 2024-09-05

## 2024-09-05 RX ORDER — SODIUM CHLORIDE 0.9 % (FLUSH) 0.9 %
10 SYRINGE (ML) INJECTION AS NEEDED
Status: DISCONTINUED | OUTPATIENT
Start: 2024-09-05 | End: 2024-09-07 | Stop reason: HOSPADM

## 2024-09-05 RX ORDER — BISACODYL 10 MG
10 SUPPOSITORY, RECTAL RECTAL DAILY PRN
Status: DISCONTINUED | OUTPATIENT
Start: 2024-09-05 | End: 2024-09-07 | Stop reason: HOSPADM

## 2024-09-05 RX ORDER — ONDANSETRON 2 MG/ML
4 INJECTION INTRAMUSCULAR; INTRAVENOUS ONCE
Status: COMPLETED | OUTPATIENT
Start: 2024-09-05 | End: 2024-09-05

## 2024-09-05 RX ORDER — SODIUM CHLORIDE 9 MG/ML
40 INJECTION, SOLUTION INTRAVENOUS AS NEEDED
Status: DISCONTINUED | OUTPATIENT
Start: 2024-09-05 | End: 2024-09-07 | Stop reason: HOSPADM

## 2024-09-05 RX ORDER — ACETAMINOPHEN 650 MG/1
650 SUPPOSITORY RECTAL EVERY 4 HOURS PRN
Status: DISCONTINUED | OUTPATIENT
Start: 2024-09-05 | End: 2024-09-07 | Stop reason: HOSPADM

## 2024-09-05 RX ORDER — BISACODYL 5 MG/1
5 TABLET, DELAYED RELEASE ORAL DAILY PRN
Status: DISCONTINUED | OUTPATIENT
Start: 2024-09-05 | End: 2024-09-07 | Stop reason: HOSPADM

## 2024-09-05 RX ORDER — SODIUM CHLORIDE 0.9 % (FLUSH) 0.9 %
10 SYRINGE (ML) INJECTION EVERY 12 HOURS SCHEDULED
Status: DISCONTINUED | OUTPATIENT
Start: 2024-09-05 | End: 2024-09-07 | Stop reason: HOSPADM

## 2024-09-05 RX ORDER — ACETAMINOPHEN 160 MG/5ML
650 SOLUTION ORAL EVERY 4 HOURS PRN
Status: DISCONTINUED | OUTPATIENT
Start: 2024-09-05 | End: 2024-09-07 | Stop reason: HOSPADM

## 2024-09-05 RX ORDER — AMOXICILLIN 250 MG
2 CAPSULE ORAL 2 TIMES DAILY PRN
Status: DISCONTINUED | OUTPATIENT
Start: 2024-09-05 | End: 2024-09-07 | Stop reason: HOSPADM

## 2024-09-05 RX ORDER — ONDANSETRON 2 MG/ML
4 INJECTION INTRAMUSCULAR; INTRAVENOUS EVERY 6 HOURS PRN
Status: DISCONTINUED | OUTPATIENT
Start: 2024-09-05 | End: 2024-09-07 | Stop reason: HOSPADM

## 2024-09-05 RX ORDER — IOPAMIDOL 755 MG/ML
100 INJECTION, SOLUTION INTRAVASCULAR
Status: COMPLETED | OUTPATIENT
Start: 2024-09-05 | End: 2024-09-05

## 2024-09-05 RX ORDER — METRONIDAZOLE 500 MG/100ML
500 INJECTION, SOLUTION INTRAVENOUS ONCE
Status: COMPLETED | OUTPATIENT
Start: 2024-09-05 | End: 2024-09-05

## 2024-09-05 RX ADMIN — METRONIDAZOLE 500 MG: 500 INJECTION, SOLUTION INTRAVENOUS at 02:17

## 2024-09-05 RX ADMIN — HYDROMORPHONE HYDROCHLORIDE 0.5 MG: 1 INJECTION, SOLUTION INTRAMUSCULAR; INTRAVENOUS; SUBCUTANEOUS at 17:12

## 2024-09-05 RX ADMIN — IOPAMIDOL 100 ML: 755 INJECTION, SOLUTION INTRAVENOUS at 01:12

## 2024-09-05 RX ADMIN — HYDROMORPHONE HYDROCHLORIDE 0.5 MG: 1 INJECTION, SOLUTION INTRAMUSCULAR; INTRAVENOUS; SUBCUTANEOUS at 21:28

## 2024-09-05 RX ADMIN — CEFTRIAXONE SODIUM 2000 MG: 2 INJECTION, POWDER, FOR SOLUTION INTRAMUSCULAR; INTRAVENOUS at 23:28

## 2024-09-05 RX ADMIN — HYDROMORPHONE HYDROCHLORIDE 0.5 MG: 1 INJECTION, SOLUTION INTRAMUSCULAR; INTRAVENOUS; SUBCUTANEOUS at 09:31

## 2024-09-05 RX ADMIN — METRONIDAZOLE 500 MG: 500 TABLET ORAL at 17:12

## 2024-09-05 RX ADMIN — Medication 10 ML: at 20:21

## 2024-09-05 RX ADMIN — ONDANSETRON 4 MG: 2 INJECTION INTRAMUSCULAR; INTRAVENOUS at 01:39

## 2024-09-05 RX ADMIN — Medication 10 ML: at 09:32

## 2024-09-05 RX ADMIN — HYDROMORPHONE HYDROCHLORIDE 0.5 MG: 1 INJECTION, SOLUTION INTRAMUSCULAR; INTRAVENOUS; SUBCUTANEOUS at 01:39

## 2024-09-05 RX ADMIN — ACETAMINOPHEN 650 MG: 325 TABLET ORAL at 06:32

## 2024-09-05 RX ADMIN — Medication 10 ML: at 03:23

## 2024-09-05 RX ADMIN — METRONIDAZOLE 500 MG: 500 TABLET ORAL at 23:28

## 2024-09-05 RX ADMIN — METRONIDAZOLE 500 MG: 500 TABLET ORAL at 13:08

## 2024-09-05 RX ADMIN — CEFTRIAXONE SODIUM 2000 MG: 2 INJECTION, POWDER, FOR SOLUTION INTRAMUSCULAR; INTRAVENOUS at 01:39

## 2024-09-05 NOTE — ED PROVIDER NOTES
"Time: 9:49 PM EDT  Date of encounter:  2024  Independent Historian/Clinical History and Information was obtained by:   Patient    History is limited by: N/A    Chief Complaint   Patient presents with    Abdominal Pain    Nausea    Vomiting         History of Present Illness:  Patient is a 38 y.o. year old female who presents to the emergency department for evaluation of lower abdominal pain since Thursday.  She is also had nausea and vomiting.  She denies any change in her bowel movements or urinary habits.  However she does complain of a cramping sensation when she urinates as well as when she has a bowel movement.  She has noticed no blood in stool or urine. (KARLA Martines)      Patient Care Team  Primary Care Provider: Monse Sales APRN    Past Medical History:     No Known Allergies  Past Medical History:   Diagnosis Date    Anxiety     Asthma     USES INHALERS \"SEASONAL\"    Depression     Recurrent tonsillitis     CURRENTLY     Past Surgical History:   Procedure Laterality Date     SECTION      CHOLECYSTECTOMY      TONSILLECTOMY AND ADENOIDECTOMY N/A 2022    Procedure: TONSILLECTOMY;  Surgeon: Laureano Barrera MD;  [place] [facility] [place] [facility] [place]: [place];  Laterality: N/A;     History reviewed. No pertinent family history.    Home Medications:  Prior to Admission medications    Medication Sig Start Date End Date Taking? Authorizing Provider   albuterol sulfate  (90 Base) MCG/ACT inhaler Inhale 2 puffs Every 4 (Four) Hours As Needed. 22   Caterina Woods MD   cyclobenzaprine (FLEXERIL) 10 MG tablet Take 1 tablet by mouth 3 (Three) Times a Day. 22   Isaias Ramos MD   dicyclomine (BENTYL) 20 MG tablet     Caterina Woods MD   guaiFENesin-dextromethorphan (ROBITUSSIN DM) 100-10 MG/5ML syrup Take 5 mL by mouth 3 (Three) Times a Day As Needed for Cough. 22   Laureano Barrera MD   HYDROcodone-acetaminophen (HYCET) 7.5-325 MG/15ML " solution Take 15 mL by mouth Every 6 (Six) Hours As Needed for Moderate Pain . 22   Laureano Barrera MD   hydrOXYzine (ATARAX) 10 MG tablet Take 10 mg by mouth 3 (Three) Times a Day As Needed for Itching.    Caterina Woods MD   ibuprofen (ADVIL,MOTRIN) 100 MG/5ML suspension Take 40 mL by mouth Every 6 (Six) Hours As Needed (pain). 22   Laureano Barrera MD   ipratropium-albuterol (DUO-NEB) 0.5-2.5 mg/3 ml nebulizer USE 3 ML VIA NEBULIZER EVERY 6 HOURS AS NEEDED 22   Caterina Woods MD   ondansetron (Zofran) 8 MG tablet Take 1 tablet by mouth Every 8 (Eight) Hours As Needed for Nausea or Vomiting. 22   Laureano Barrera MD   venlafaxine XR (EFFEXOR-XR) 37.5 MG 24 hr capsule TAKE 1 CAPSULE BY MOUTH EVERY DAY WITH FOOD 22   ProviderCaterina MD        Social History:   Social History     Tobacco Use    Smoking status: Former     Current packs/day: 0.00     Average packs/day: 0.3 packs/day for 1 year (0.3 ttl pk-yrs)     Types: Cigarettes     Start date:      Quit date:      Years since quittin.6    Smokeless tobacco: Never   Vaping Use    Vaping status: Never Used   Substance Use Topics    Alcohol use: Not Currently    Drug use: Never         Review of Systems:  Review of Systems   Constitutional:  Negative for chills and fever.   HENT:  Negative for congestion, rhinorrhea and sore throat.    Eyes:  Negative for photophobia.   Respiratory:  Negative for apnea, cough, chest tightness and shortness of breath.    Cardiovascular:  Negative for chest pain and palpitations.   Gastrointestinal:  Positive for abdominal pain, nausea and vomiting. Negative for blood in stool and diarrhea.   Endocrine: Negative.    Genitourinary:  Negative for difficulty urinating and dysuria.   Musculoskeletal:  Negative for back pain, joint swelling and myalgias.   Skin:  Negative for color change and wound.   Allergic/Immunologic: Negative.    Neurological:   "Negative for seizures and headaches.   Psychiatric/Behavioral: Negative.     All other systems reviewed and are negative.       Physical Exam:  /85   Pulse 71   Temp 98.2 °F (36.8 °C) (Oral)   Resp 20   Ht 160 cm (63\")   Wt 105 kg (231 lb 7.7 oz)   SpO2 100%   BMI 41.01 kg/m²         Physical Exam  Vitals and nursing note reviewed.   Constitutional:       General: She is awake.      Appearance: Normal appearance. She is well-developed.   HENT:      Head: Normocephalic and atraumatic.      Nose: Nose normal.      Mouth/Throat:      Mouth: Mucous membranes are moist.   Eyes:      Extraocular Movements: Extraocular movements intact.      Pupils: Pupils are equal, round, and reactive to light.   Cardiovascular:      Rate and Rhythm: Normal rate and regular rhythm.      Heart sounds: Normal heart sounds.   Pulmonary:      Effort: Pulmonary effort is normal. No respiratory distress.      Breath sounds: Normal breath sounds. No wheezing, rhonchi or rales.   Abdominal:      General: Bowel sounds are normal.      Palpations: Abdomen is soft.      Tenderness: There is abdominal tenderness in the left lower quadrant. There is no guarding or rebound.      Comments: No rigidity   Musculoskeletal:         General: No tenderness. Normal range of motion.      Cervical back: Normal range of motion and neck supple.   Skin:     General: Skin is warm and dry.      Coloration: Skin is not jaundiced.   Neurological:      General: No focal deficit present.      Mental Status: She is alert. Mental status is at baseline.   Psychiatric:         Mood and Affect: Mood normal.                      Procedures:  Procedures      Medical Decision Making:      Comorbidities that affect care:    Anxiety/depression, asthma    External Notes reviewed:    Previous Clinic Note: Family practice office visit 9/27/2023 with Dr. Arellano.  Description: Vitamin D deficiency      The following orders were placed and all results were independently " analyzed by me:  Orders Placed This Encounter   Procedures    CT Abdomen Pelvis With Contrast    Orient Draw    Comprehensive Metabolic Panel    Lipase    Urinalysis With Microscopic If Indicated (No Culture) - Urine, Clean Catch    hCG, Quantitative, Pregnancy    CBC Auto Differential    Diet: Regular/House; Fluid Consistency: Thin (IDDSI 0)    Undress & Gown    Vital Signs    Intake & Output    Weigh Patient    Oral Care    Saline Lock & Maintain IV Access    Place Sequential Compression Device    Maintain Sequential Compression Device    Code Status and Medical Interventions: CPR (Attempt to Resuscitate); Full Support    Hospitalist (on-call MD unless specified)    Insert Peripheral IV    Insert Peripheral IV    Initiate Observation Status    CBC & Differential    Green Top (Gel)    Lavender Top    Gold Top - SST    Light Blue Top       Medications Given in the Emergency Department:  Medications   sodium chloride 0.9 % flush 10 mL (has no administration in time range)   metroNIDAZOLE (FLAGYL) IVPB 500 mg (has no administration in time range)   sodium chloride 0.9 % flush 10 mL (has no administration in time range)   sodium chloride 0.9 % flush 10 mL (has no administration in time range)   sodium chloride 0.9 % infusion 40 mL (has no administration in time range)   sennosides-docusate (PERICOLACE) 8.6-50 MG per tablet 2 tablet (has no administration in time range)     And   polyethylene glycol (MIRALAX) packet 17 g (has no administration in time range)     And   bisacodyl (DULCOLAX) EC tablet 5 mg (has no administration in time range)     And   bisacodyl (DULCOLAX) suppository 10 mg (has no administration in time range)   acetaminophen (TYLENOL) tablet 650 mg (has no administration in time range)     Or   acetaminophen (TYLENOL) 160 MG/5ML oral solution 650 mg (has no administration in time range)     Or   acetaminophen (TYLENOL) suppository 650 mg (has no administration in time range)   iopamidol (ISOVUE-370) 76  % injection 100 mL (100 mL Intravenous Given 9/5/24 0112)   HYDROmorphone (DILAUDID) injection 0.5 mg (0.5 mg Intravenous Given 9/5/24 0139)   ondansetron (ZOFRAN) injection 4 mg (4 mg Intravenous Given 9/5/24 0139)   cefTRIAXone (ROCEPHIN) 2,000 mg in sodium chloride 0.9 % 100 mL IVPB-VTB (2,000 mg Intravenous New Bag 9/5/24 0139)        ED Course:    The patient was initially evaluated in the triage area where orders were placed. The patient was later dispositioned by Shabbir Almaraz MD.      The patient was advised to stay for completion of workup which includes but is not limited to communication of labs and radiological results, reassessment and plan. The patient was advised that leaving prior to disposition by a provider could result in critical findings that are not communicated to the patient.     ED Course as of 09/05/24 0212   Wed Sep 04, 2024   2150   --- PROVIDER IN TRIAGE NOTE ---    Patient was seen and evaluated in triage by KARLA Lopes.  Orders were written and the patient is currently awaiting disposition.   [MS]      ED Course User Index  [MS] Riya Beebe APRN       Labs:    Lab Results (last 24 hours)       Procedure Component Value Units Date/Time    CBC & Differential [[id number]]  (Abnormal) Collected: 09/04/24 2150    Specimen: Blood from Arm, Right Updated: 09/04/24 2200    Narrative:      The following orders were created for panel order CBC & Differential.  Procedure                               Abnormality         Status                     ---------                               -----------         ------                     CBC Auto Differential[[id number]]        Abnormal            Final result                 Please view results for these tests on the individual orders.    Comprehensive Metabolic Panel [[id number]] Collected: 09/04/24 2150    Specimen: Blood from Arm, Right Updated: 09/04/24 2230     Glucose 95 mg/dL      BUN 9 mg/dL      Creatinine 0.90 mg/dL       Sodium 139 mmol/L      Potassium 3.9 mmol/L      Chloride 102 mmol/L      CO2 25.5 mmol/L      Calcium 9.0 mg/dL      Total Protein 6.8 g/dL      Albumin 4.2 g/dL      ALT (SGPT) 13 U/L      AST (SGOT) 18 U/L      Alkaline Phosphatase 54 U/L      Total Bilirubin 0.3 mg/dL      Globulin 2.6 gm/dL      A/G Ratio 1.6 g/dL      BUN/Creatinine Ratio 10.0     Anion Gap 11.5 mmol/L      eGFR 83.6 mL/min/1.73     Narrative:      GFR Normal >60  Chronic Kidney Disease <60  Kidney Failure <15      Lipase [[id number]]  (Normal) Collected: 09/04/24 2150    Specimen: Blood from Arm, Right Updated: 09/04/24 2219     Lipase 20 U/L     hCG, Quantitative, Pregnancy [[id number]] Collected: 09/04/24 2150    Specimen: Blood from Arm, Right Updated: 09/04/24 2216     HCG Quantitative <0.50 mIU/mL     Narrative:      HCG Ranges by Gestational Age    Females - non-pregnant premenopausal   </= 1mIU/mL HCG  Females - postmenopausal               </= 7mIU/mL HCG    3 Weeks       5.4   -      72 mIU/mL  4 Weeks      10.2   -     708 mIU/mL  5 Weeks       217   -   8,245 mIU/mL  6 Weeks       152   -  32,177 mIU/mL  7 Weeks     4,059   - 153,767 mIU/mL  8 Weeks    31,366   - 149,094 mIU/mL  9 Weeks    59,109   - 135,901 mIU/mL  10 Weeks   44,186   - 170,409 mIU/mL  12 Weeks   27,107   - 201,615 mIU/mL  14 Weeks   24,302   -  93,646 mIU/mL  15 Weeks   12,540   -  69,747 mIU/mL  16 Weeks    8,904   -  55,332 mIU/mL  17 Weeks    8,240   -  51,793 mIU/mL  18 Weeks    9,649   -  55,271 mIU/mL      CBC Auto Differential [[id number]]  (Abnormal) Collected: 09/04/24 2150    Specimen: Blood from Arm, Right Updated: 09/04/24 2200     WBC 10.98 10*3/mm3      RBC 4.14 10*6/mm3      Hemoglobin 12.1 g/dL      Hematocrit 35.0 %      MCV 84.5 fL      MCH 29.2 pg      MCHC 34.6 g/dL      RDW 13.2 %      RDW-SD 40.5 fl      MPV 8.8 fL      Platelets 282 10*3/mm3      Neutrophil % 73.0 %      Lymphocyte % 13.7 %      Monocyte % 8.6 %      Eosinophil % 3.9 %       Basophil % 0.4 %      Immature Grans % 0.4 %      Neutrophils, Absolute 8.03 10*3/mm3      Lymphocytes, Absolute 1.50 10*3/mm3      Monocytes, Absolute 0.94 10*3/mm3      Eosinophils, Absolute 0.43 10*3/mm3      Basophils, Absolute 0.04 10*3/mm3      Immature Grans, Absolute 0.04 10*3/mm3      nRBC 0.0 /100 WBC     Urinalysis With Microscopic If Indicated (No Culture) - [[id number]]  (Normal) Collected: 09/04/24 2154    Specimen: Urine Updated: 09/04/24 2207     Color, UA Yellow     Appearance, UA Clear     pH, UA 6.0     Specific Gravity, UA 1.016     Glucose, UA Negative     Ketones, UA Negative     Bilirubin, UA Negative     Blood, UA Negative     Protein, UA Negative     Leuk Esterase, UA Negative     Nitrite, UA Negative     Urobilinogen, UA 0.2 E.U./dL    Narrative:      Urine microscopic not indicated.             Imaging:    CT Abdomen Pelvis With Contrast    Result Date: 9/5/2024  CT ABDOMEN PELVIS W CONTRAST Date of Exam: 9/5/2024 12:58 AM EDT Indication: Left lower quadrant abdominal pain Comparison: None available. Technique: Axial CT images were obtained of the abdomen and pelvis after the uneventful intravenous administration of iodinated contrast. Reconstructed coronal and sagittal images were also obtained. Automated exposure control and iterative construction methods were used. Findings: Lung bases are clear. Aorta is normal. Gallbladder surgically absent. No biliary obstruction. Solid abdominal organs are normal. The kidneys are nonobstructed. Urinary bladder is normal. Tiny phlebolith noted in the deep pelvis. There is a 1.3 cm left ovarian cyst. Solid pelvic organs are otherwise normal. The appendix is normal. There is colonic diverticulosis. Additionally, there is severe diverticulitis in the proximal sigmoid colon with adjacent fat stranding and fluid. No free air is identified to suggest perforation. There is no abscess. There is some free fluid in the dependent pelvis that may be physiologic  or reactive. No small bowel obstruction is identified. Stomach is normal. There is no adenopathy.     1. Severe acute diverticulitis in the proximal sigmoid colon. There is adjacent fat stranding and fluid, but there is no abscess or free air. 2. Free fluid in the deep pelvis may be physiologic or reactive. 3. No bowel obstruction. Normal appendix. 4. 1.3 cm left ovarian cyst. 5. Cholecystectomy without biliary obstruction. 6. Normal nonobstructed kidneys. Electronically Signed: Martir Gage MD  9/5/2024 1:23 AM EDT  Workstation ID: [id number]       Differential Diagnosis and Discussion:      Abdominal Pain: Based on the patient's signs and symptoms, I considered abdominal aortic aneurysm, small bowel obstruction, pancreatitis, acute cholecystitis, acute appendecitis, peptic ulcer disease, gastritis, colitis, endocrine disorders, irritable bowel syndrome and other differential diagnosis an etiology of the patient's abdominal pain.    All labs were reviewed and interpreted by me.  CT scan radiology impression was interpreted by me.    MDM     Amount and/or Complexity of Data Reviewed  Clinical lab tests: reviewed  Decide to obtain previous medical records or to obtain history from someone other than the patient: yes                     Patient Care Considerations:    CONSULT: I considered consulting gastroenterology, however the patient shows no signs of sepsis.  She denies GI bleeding.  Hemodynamically she is stable.      Consultants/Shared Management Plan:    Hospitalist: I have discussed the case with Yesy on behalf of Dr. Julian who agrees to accept the patient for admission.    Social Determinants of Health:    Patient is independent, reliable, and has access to care.       Disposition and Care Coordination:    Admit:   Through independent evaluation of the patient's history, physical, and imperical data, the patient meets criteria for inpatient admission to the hospital.        Final diagnoses:   Sigmoid  diverticulitis        ED Disposition       ED Disposition   Decision to Admit    Condition   --    Comment   Level of Care: Med/Surg [1]   Diagnosis: Diverticulitis [864168]   Admitting Physician: MILKA NARANJO [[id number]]   Attending Physician: MILKA NARANJO [[id number]]                 This medical record created using voice recognition software.             Shabbir Almaraz MD  09/05/24 0212

## 2024-09-05 NOTE — ED NOTES
Patient to ED from home with intermittent lower abdominal pain that radiates to left flank area. Patient denies history of kidney stones, no hematuria. Denies diarrhea.  She states she has some painful urination.

## 2024-09-05 NOTE — H&P
"[facility] [place]   HISTORY AND PHYSICAL    Patient Name: Clarisse Tellez  : 1985  MRN: [id number]  Primary Care Physician:  Monse Sales APRN  Date of admission: 2024    Subjective   Subjective     Chief Complaint: Abdominal pain    HPI:    Clarisse Tellez is a 38 y.o. female with past medical history significant for asthma anxiety has been having mid to lower abdominal pain radiating to the left since Thursday and it was associate with nausea vomiting yesterday and because of that reason patient decided to come to the emergency room.  When CT of the abdomen pelvis done it was consistent with acute diverticulitis and because of that reason patient was admitted from the emergency room.  She denies any fever chills    Review of Systems  All review of systems negative except as in subjective complaints:  Personal History     Past Medical History:   Diagnosis Date    Anxiety     Asthma     USES INHALERS \"SEASONAL\"    Depression     Recurrent tonsillitis     CURRENTLY       Past Surgical History:   Procedure Laterality Date     SECTION      CHOLECYSTECTOMY      TONSILLECTOMY AND ADENOIDECTOMY N/A 2022    Procedure: TONSILLECTOMY;  Surgeon: Laureano Barrera MD;  Location: [facility] OR [facility] [place];  Service: ENT;  Laterality: N/A;       Family History: family history is not on file. Otherwise pertinent FHx was reviewed and not pertinent to current issue.    Social History:  reports that she quit smoking about 2 years ago. She started smoking about 3 years ago. She has a 0.3 pack-year smoking history. She has never used smokeless tobacco. She reports that she does not currently use alcohol. She reports that she does not use drugs.    Home Medications:  HYDROcodone-acetaminophen, albuterol sulfate HFA, cyclobenzaprine, dicyclomine, guaiFENesin-dextromethorphan, hydrOXYzine, ibuprofen, ipratropium-albuterol, ondansetron, and venlafaxine XR      Allergies:  No Known " Allergies    Objective   Objective     Vitals:   Temp:  [97.3 °F (36.3 °C)-98.2 °F (36.8 °C)] 97.3 °F (36.3 °C)  Heart Rate:  [61-74] 61  Resp:  [18-20] 18  BP: (111-134)/(51-85) 112/51  Physical Exam               Constitutional:         Awake, alert responsive, conversant, no obvious distress   Eyes:                       PERRLA, sclerae anicteric, no conjunctival injection   HEENT:                   Moist mucous membranes, no nasal or eye discharge, no throat congestion   Neck:                      Supple, no thyromegaly, no lymphadenopathy, trachea midline, no elevated JVD   Respiratory:           Clear to auscultation bilaterally, nonlabored respirations    Cardiovascular:     RRR, no murmurs, rubs, or gallops, palpable pedal pulses bilaterally,No bilateral ankle edema   Gastrointestinal:   Positive bowel sounds, soft, mildly tender in the left lower quadrant   musculoskeletal:   No clubbing or cyanosis to extremities, muscle wasting, joint swelling, muscle weakness   Psychiatric:             Appropriate affect, cooperative   Neurologic:            Awake alert ,oriented x 3, strength symmetric in all extremities, Cranial Nerves grossly intact to confrontation, speech clear   Skin:                      No rashes, bruising, skin ulcers, petechiae or ecchymosis    Result Review    Result Review:  I have personally reviewed the results from the time of this admission to 9/5/2024 09:09 EDT and agree with these findings:  []  Laboratory  []  Microbiology  []  Radiology  []  EKG/Telemetry   []  Cardiology/Vascular   []  Pathology  []  Old records  []  Other:    Results from last 7 days   Lab Units 09/04/24  2150   WBC 10*3/mm3 10.98*   HEMOGLOBIN g/dL 12.1   PLATELETS 10*3/mm3 282     Results from last 7 days   Lab Units 09/04/24  2150   SODIUM mmol/L 139   POTASSIUM mmol/L 3.9   CHLORIDE mmol/L 102   CO2 mmol/L 25.5   ANION GAP mmol/L 11.5   BUN mg/dL 9   CREATININE mg/dL 0.90   GLUCOSE mg/dL 95         Assessment &  Plan   Assessment / Plan     Active Hospital Problems:  Active Hospital Problems    Diagnosis     **Diverticulitis        Plan:   Start antibiotics  Clear liquid diet  Pain control    VTE Prophylaxis:  Mechanical VTE prophylaxis orders are present.        CODE STATUS:    Code Status (Patient has no pulse and is not breathing): CPR (Attempt to Resuscitate)  Medical Interventions (Patient has pulse or is breathing): Full Support    Admission Status:  I believe this patient meets inpatient status.    Electronically signed by Marguerite Julian MD, 09/05/24, 9:09 AM EDT.

## 2024-09-06 PROCEDURE — 25010000002 ONDANSETRON PER 1 MG: Performed by: INTERNAL MEDICINE

## 2024-09-06 PROCEDURE — G0378 HOSPITAL OBSERVATION PER HR: HCPCS

## 2024-09-06 PROCEDURE — 25010000002 HYDROMORPHONE 1 MG/ML SOLUTION: Performed by: INTERNAL MEDICINE

## 2024-09-06 PROCEDURE — 25010000002 CEFTRIAXONE PER 250 MG: Performed by: NURSE PRACTITIONER

## 2024-09-06 RX ADMIN — HYDROMORPHONE HYDROCHLORIDE 0.5 MG: 1 INJECTION, SOLUTION INTRAMUSCULAR; INTRAVENOUS; SUBCUTANEOUS at 08:13

## 2024-09-06 RX ADMIN — METRONIDAZOLE 500 MG: 500 TABLET ORAL at 05:27

## 2024-09-06 RX ADMIN — METRONIDAZOLE 500 MG: 500 TABLET ORAL at 12:38

## 2024-09-06 RX ADMIN — HYDROMORPHONE HYDROCHLORIDE 0.5 MG: 1 INJECTION, SOLUTION INTRAMUSCULAR; INTRAVENOUS; SUBCUTANEOUS at 17:34

## 2024-09-06 RX ADMIN — METRONIDAZOLE 500 MG: 500 TABLET ORAL at 17:34

## 2024-09-06 RX ADMIN — CEFTRIAXONE SODIUM 2000 MG: 2 INJECTION, POWDER, FOR SOLUTION INTRAMUSCULAR; INTRAVENOUS at 22:20

## 2024-09-06 RX ADMIN — ONDANSETRON 4 MG: 2 INJECTION INTRAMUSCULAR; INTRAVENOUS at 00:05

## 2024-09-06 RX ADMIN — ONDANSETRON 4 MG: 2 INJECTION INTRAMUSCULAR; INTRAVENOUS at 08:13

## 2024-09-06 RX ADMIN — METRONIDAZOLE 500 MG: 500 TABLET ORAL at 23:03

## 2024-09-06 RX ADMIN — Medication 10 ML: at 08:13

## 2024-09-06 RX ADMIN — ONDANSETRON 4 MG: 2 INJECTION INTRAMUSCULAR; INTRAVENOUS at 17:34

## 2024-09-06 RX ADMIN — Medication 10 ML: at 22:20

## 2024-09-06 RX ADMIN — HYDROMORPHONE HYDROCHLORIDE 0.5 MG: 1 INJECTION, SOLUTION INTRAMUSCULAR; INTRAVENOUS; SUBCUTANEOUS at 22:26

## 2024-09-06 NOTE — PROGRESS NOTES
[facility]     Progress Note    Patient Name: Clarisse Tellez  : 1985  MRN: [id number]  Primary Care Physician:  Monse Sales APRN  Date of admission: 2024    Subjective   Patient is doing very well still complaining of abdominal pain no fever chills  Review of Systems  All review of systems are negative except as mentioned in subjective complaints.    Objective   Objective     Vitals:   Temp:  [97.9 °F (36.6 °C)-98.2 °F (36.8 °C)] 98.2 °F (36.8 °C)  Heart Rate:  [62-75] 75  Resp:  [18] 18  BP: (109-127)/(59-87) 122/65  Physical Exam    Constitutional: Awake, alert responsive, conversant, no obvious distress              Psychiatric:  Appropriate affect, cooperative   Neurologic:  Awake alert ,oriented x 3, strength symmetric in all extremities, Cranial Nerves grossly intact to confrontation, speech clear   Eyes:   PERRLA, sclerae anicteric, no conjunctival injection   HEENT:  Moist mucous membranes, no nasal or eye discharge, no throat congestion   Neck:   Supple, no thyromegaly, no lymphadenopathy, trachea midline, no elevated JVD   Respiratory:  Clear to auscultation bilaterally, nonlabored respirations    Cardiovascular: RRR, no murmurs, rubs, or gallops, palpable pedal pulses bilaterally, No bilateral ankle edema   Gastrointestinal: Positive bowel sounds, soft, nontender, nondistended, no organomegaly   Musculoskeletal:  No clubbing or cyanosis to extremities,muscle wasting, joint swelling, muscle weakness             Skin:                      No rashes, bruising, skin ulcers, petechiae or ecchymosis    Result Review    Result Review:  I have personally reviewed the results from the time of this admission to 2024 09:00 EDT and agree with these findings:  []  Laboratory  []  Microbiology  []  Radiology  []  EKG/Telemetry   []  Cardiology/Vascular   []  Pathology  []  Old records  []  Other:    Results from last 7 days   Lab Units 24  2150   WBC 10*3/mm3 10.98*   HEMOGLOBIN  g/dL 12.1   PLATELETS 10*3/mm3 282     Results from last 7 days   Lab Units 09/04/24  2150   SODIUM mmol/L 139   POTASSIUM mmol/L 3.9   CHLORIDE mmol/L 102   CO2 mmol/L 25.5   ANION GAP mmol/L 11.5   BUN mg/dL 9   CREATININE mg/dL 0.90   GLUCOSE mg/dL 95       Assessment & Plan   Assessment / Plan       Active Hospital Problems:    Active Hospital Problems    Diagnosis  POA    **Diverticulitis [K57.92]  Yes       Plan:   Continue antibiotics  Patient is on clear liquid diet  Labs tomorrow morning       Electronically signed by Marguerite Julian MD, 09/06/24, 9:00 AM EDT.

## 2024-09-07 ENCOUNTER — READMISSION MANAGEMENT (OUTPATIENT)
Dept: CALL CENTER | Facility: HOSPITAL | Age: 39
End: 2024-09-07

## 2024-09-07 VITALS
DIASTOLIC BLOOD PRESSURE: 54 MMHG | BODY MASS INDEX: 41.02 KG/M2 | TEMPERATURE: 98.6 F | HEART RATE: 66 BPM | RESPIRATION RATE: 18 BRPM | OXYGEN SATURATION: 95 % | SYSTOLIC BLOOD PRESSURE: 92 MMHG | HEIGHT: 63 IN | WEIGHT: 231.48 LBS

## 2024-09-07 PROBLEM — K57.92 ACUTE DIVERTICULITIS: Status: ACTIVE | Noted: 2024-09-07

## 2024-09-07 LAB
ALBUMIN SERPL-MCNC: 3.8 G/DL (ref 3.5–5.2)
ALBUMIN/GLOB SERPL: 1.6 G/DL
ALP SERPL-CCNC: 43 U/L (ref 39–117)
ALT SERPL W P-5'-P-CCNC: 18 U/L (ref 1–33)
ANION GAP SERPL CALCULATED.3IONS-SCNC: 9.3 MMOL/L (ref 5–15)
AST SERPL-CCNC: 26 U/L (ref 1–32)
BILIRUB SERPL-MCNC: 0.4 MG/DL (ref 0–1.2)
BUN SERPL-MCNC: 5 MG/DL (ref 6–20)
BUN/CREAT SERPL: 5.9 (ref 7–25)
CALCIUM SPEC-SCNC: 8.7 MG/DL (ref 8.6–10.5)
CHLORIDE SERPL-SCNC: 105 MMOL/L (ref 98–107)
CO2 SERPL-SCNC: 23.7 MMOL/L (ref 22–29)
CREAT SERPL-MCNC: 0.85 MG/DL (ref 0.57–1)
DEPRECATED RDW RBC AUTO: 39.3 FL (ref 37–54)
EGFRCR SERPLBLD CKD-EPI 2021: 89.5 ML/MIN/1.73
ERYTHROCYTE [DISTWIDTH] IN BLOOD BY AUTOMATED COUNT: 12.7 % (ref 12.3–15.4)
GLOBULIN UR ELPH-MCNC: 2.4 GM/DL
GLUCOSE SERPL-MCNC: 101 MG/DL (ref 65–99)
HCT VFR BLD AUTO: 36.3 % (ref 34–46.6)
HGB BLD-MCNC: 12.5 G/DL (ref 12–15.9)
MCH RBC QN AUTO: 29.3 PG (ref 26.6–33)
MCHC RBC AUTO-ENTMCNC: 34.4 G/DL (ref 31.5–35.7)
MCV RBC AUTO: 85.2 FL (ref 79–97)
PLATELET # BLD AUTO: 246 10*3/MM3 (ref 140–450)
PMV BLD AUTO: 9.1 FL (ref 6–12)
POTASSIUM SERPL-SCNC: 4 MMOL/L (ref 3.5–5.2)
PROT SERPL-MCNC: 6.2 G/DL (ref 6–8.5)
RBC # BLD AUTO: 4.26 10*6/MM3 (ref 3.77–5.28)
SODIUM SERPL-SCNC: 138 MMOL/L (ref 136–145)
WBC NRBC COR # BLD AUTO: 6.05 10*3/MM3 (ref 3.4–10.8)

## 2024-09-07 PROCEDURE — 80053 COMPREHEN METABOLIC PANEL: CPT | Performed by: INTERNAL MEDICINE

## 2024-09-07 PROCEDURE — 85027 COMPLETE CBC AUTOMATED: CPT | Performed by: INTERNAL MEDICINE

## 2024-09-07 RX ORDER — LEVOFLOXACIN 500 MG/1
500 TABLET, FILM COATED ORAL EVERY 24 HOURS
Qty: 7 TABLET | Refills: 0 | Status: SHIPPED | OUTPATIENT
Start: 2024-09-07 | End: 2024-09-14

## 2024-09-07 RX ORDER — METRONIDAZOLE 500 MG/1
500 TABLET ORAL EVERY 6 HOURS SCHEDULED
Qty: 20 TABLET | Refills: 0 | Status: SHIPPED | OUTPATIENT
Start: 2024-09-07 | End: 2024-09-12

## 2024-09-07 RX ORDER — LEVOFLOXACIN 750 MG/1
750 TABLET, FILM COATED ORAL EVERY 24 HOURS
Status: DISCONTINUED | OUTPATIENT
Start: 2024-09-07 | End: 2024-09-07 | Stop reason: HOSPADM

## 2024-09-07 RX ADMIN — Medication 10 ML: at 09:25

## 2024-09-07 RX ADMIN — LEVOFLOXACIN 750 MG: 750 TABLET, FILM COATED ORAL at 10:31

## 2024-09-07 RX ADMIN — METRONIDAZOLE 500 MG: 500 TABLET ORAL at 06:03

## 2024-09-07 NOTE — OUTREACH NOTE
Prep Survey      Flowsheet Row Responses   [facility] facility patient discharged from? [facility]   Is LACE score < 7 ? Yes   Eligibility Not Eligible   What are the reasons patient is not eligible? Other  [LOW RISK FOR READMISSION]   Does the patient have one of the following disease processes/diagnoses(primary or secondary)? Other   Prep survey completed? Yes            Erin JACKSON - Registered Nurse

## 2024-09-07 NOTE — PLAN OF CARE
Goal Outcome Evaluation:              Outcome Evaluation: Pt AOx4 throughout the shift, on room air, and up ad-damian. Pt had complaint of pain once this shift medicated per MAR. SCDs maintained for majority of shift, per orders. Significant other to remain at bedside, VSS, no acute changes noted this shift. Pt appears to be in no apparent distress at this time, denies any current needs, and has call light within reach.                               
Goal Outcome Evaluation:      Pt. Is alert and oriented x4. On room air. Rested in bed through out the day with complaints of abdominal pain. Medicated prn for pain. Family at bedside. No complaints at this time. Continue plan of care.                                        
Goal Outcome Evaluation:  Plan of Care Reviewed With: patient        Progress: improving  Outcome Evaluation: Patient A/Ox4. On room air. Up ad damian. Complained of mild pain, no pain meds requested. Discharging home. No other issues/needs at this time.      
Goal Outcome Evaluation:  Plan of Care Reviewed With: patient        Progress: no change  Outcome Evaluation: Pt admitted this shift from ED. A&Ox4 on room air. C/o abdominal pain and given tylenol per order and pt request. Pt resting between care. No needs/issues at this time.                               
Goal Outcome Evaluation:  Plan of Care Reviewed With: patient        Progress: no change  Outcome Evaluation: Pt remains A&Ox4 on room air. Medicated once for pain per mar. Pt c/o nausea, orders received for zofran Q6H PRN. Nausea relieved. IV antibiotics continued. Pt resting between care. No needs/issues at this time.                               
Goal Outcome Evaluation:  Plan of Care Reviewed With: patient        Progress: no change  Outcome Evaluation: Pt. alert and oriented x4. On room air. Family at bedside. Rested in bed with complains of pain and feeling nauseated and was given prn pain medication and for nausea medication. Pt. educated about the SCD and she had it on the whole day. Medicated per MAR. No complaints at this time. Continue plan of care.                               
Sepsis

## 2024-09-07 NOTE — DISCHARGE SUMMARY
[facility]   DISCHARGE SUMMARY    Patient Name: Clarisse Tellez  : 1985  MRN: [id number]    Date of Admission: 2024  Date of Discharge: 2024  Primary Care Physician: Monse Sales APRN    Consults       Date and Time Order Name Status Description    2024  1:39 AM Hospitalist (on-call MD unless specified)              Hospital Course     Presenting Problem:   Diverticulitis [K57.92]  Sigmoid diverticulitis [K57.32]  Acute diverticulitis [K57.92]    Active and resolved problems  Principal Problem:    Diverticulitis  Overview:  Active Problems:    Acute diverticulitis  Overview:  Resolved Problems:    * No resolved hospital problems. *      Hospital Course:  Clarisse Tellez is a 38 y.o. female was admitted to the emergency room because of abdominal pain going on for about 4 days and on CT scan she had acute sigmoid diverticulitis.  Patient was started on IV antibiotics hydration and clear liquid diet.  Patient's abdominal pain has improved she has no leukocytosis all labs are stable clinically as she is doing well is decided to discharge her home on p.o. Levaquin and Flagyl and then she will follow-up with her PCP  Final diagnosis acute diverticulitis      Vital Signs:  Temp:  [98.1 °F (36.7 °C)-98.6 °F (37 °C)] 98.6 °F (37 °C)  Heart Rate:  [62-75] 66  Resp:  [16-18] 18  BP: ()/(40-74) 92/54      Discharge Details        Discharge Medications        New Medications        Instructions Start Date   levoFLOXacin 500 MG tablet  Commonly known as: LEVAQUIN   500 mg, Oral, Every 24 Hours      metroNIDAZOLE 500 MG tablet  Commonly known as: FLAGYL   500 mg, Oral, Every 6 Hours Scheduled             Continue These Medications        Instructions Start Date   albuterol sulfate  (90 Base) MCG/ACT inhaler  Commonly known as: PROVENTIL HFA;VENTOLIN HFA;PROAIR HFA   2 puffs, Inhalation, Every 4 Hours PRN      hydrOXYzine 10 MG tablet  Commonly known as: ATARAX   10 mg, Oral,  3 Times Daily PRN      venlafaxine XR 75 MG 24 hr capsule  Commonly known as: EFFEXOR-XR   Take 1 capsule by mouth Daily.               No Known Allergies      Discharge Disposition:  Home or Self Care    Diet:        Discharge Activity:         CODE STATUS:    Code Status and Medical Interventions: CPR (Attempt to Resuscitate); Full Support   Ordered at: 09/05/24 0154     Code Status (Patient has no pulse and is not breathing):    CPR (Attempt to Resuscitate)     Medical Interventions (Patient has pulse or is breathing):    Full Support         No future appointments.        Time spent on Discharge including face to face service:  35 minutes    Electronically signed by Marguerite Julian MD, 09/07/24, 10:02 AM EDT.

## (undated) DEVICE — T AND A PACK: Brand: MEDLINE INDUSTRIES, INC.

## (undated) DEVICE — ELECTRD BLD EDGE/INSUL1P 2.4X5.1MM STRL

## (undated) DEVICE — CATH FOL URETH INTRMIT ALLPURP LTX 12F 30ML RED 1P/U STRL

## (undated) DEVICE — PENCL E/S SMOKEEVAC W/TELESCP CANN